# Patient Record
Sex: FEMALE | Race: WHITE | NOT HISPANIC OR LATINO | Employment: OTHER | URBAN - METROPOLITAN AREA
[De-identification: names, ages, dates, MRNs, and addresses within clinical notes are randomized per-mention and may not be internally consistent; named-entity substitution may affect disease eponyms.]

---

## 2017-01-01 ENCOUNTER — TELEPHONE (OUTPATIENT)
Dept: INTERNAL MEDICINE | Facility: CLINIC | Age: 72
End: 2017-01-01

## 2017-01-01 DIAGNOSIS — E11.69 TYPE 2 DIABETES MELLITUS WITH HYPERLIPIDEMIA: ICD-10-CM

## 2017-01-01 DIAGNOSIS — E78.5 TYPE 2 DIABETES MELLITUS WITH HYPERLIPIDEMIA: ICD-10-CM

## 2017-01-02 RX ORDER — GLIMEPIRIDE 4 MG/1
TABLET ORAL
Qty: 90 TABLET | Refills: 0 | Status: SHIPPED | OUTPATIENT
Start: 2017-01-02 | End: 2017-04-09 | Stop reason: SDUPTHER

## 2017-01-12 DIAGNOSIS — E78.5 TYPE 2 DIABETES MELLITUS WITH HYPERLIPIDEMIA: ICD-10-CM

## 2017-01-12 DIAGNOSIS — E11.69 TYPE 2 DIABETES MELLITUS WITH HYPERLIPIDEMIA: ICD-10-CM

## 2017-01-12 RX ORDER — SITAGLIPTIN AND METFORMIN HYDROCHLORIDE 1000; 50 MG/1; MG/1
TABLET, FILM COATED ORAL
Qty: 180 TABLET | Refills: 0 | Status: SHIPPED | OUTPATIENT
Start: 2017-01-12 | End: 2017-04-20 | Stop reason: SDUPTHER

## 2017-01-14 ENCOUNTER — PATIENT MESSAGE (OUTPATIENT)
Dept: INTERNAL MEDICINE | Facility: CLINIC | Age: 72
End: 2017-01-14

## 2017-01-26 ENCOUNTER — PATIENT MESSAGE (OUTPATIENT)
Dept: INTERNAL MEDICINE | Facility: CLINIC | Age: 72
End: 2017-01-26

## 2017-01-27 ENCOUNTER — TELEPHONE (OUTPATIENT)
Dept: FAMILY MEDICINE | Facility: CLINIC | Age: 72
End: 2017-01-27

## 2017-01-27 DIAGNOSIS — G89.29 CHRONIC RIGHT SHOULDER PAIN: Primary | ICD-10-CM

## 2017-01-27 DIAGNOSIS — M25.511 CHRONIC RIGHT SHOULDER PAIN: Primary | ICD-10-CM

## 2017-01-27 NOTE — TELEPHONE ENCOUNTER
Felixt message pasted           I would like to talk to Dr. Hahn about my shoulder. The physical therapist said I may have a torn rotator cuff.  I think (hope) it is an impinged muscle.  Also I need to talk to her about my surgery.  Thanks. Leny Eden

## 2017-02-02 ENCOUNTER — OFFICE VISIT (OUTPATIENT)
Dept: PRIMARY CARE CLINIC | Facility: CLINIC | Age: 72
End: 2017-02-02
Payer: MEDICARE

## 2017-02-02 VITALS
DIASTOLIC BLOOD PRESSURE: 72 MMHG | OXYGEN SATURATION: 98 % | BODY MASS INDEX: 28 KG/M2 | TEMPERATURE: 98 F | HEIGHT: 64 IN | SYSTOLIC BLOOD PRESSURE: 138 MMHG | WEIGHT: 164 LBS | HEART RATE: 73 BPM

## 2017-02-02 DIAGNOSIS — M25.511 ACUTE PAIN OF RIGHT SHOULDER: Primary | ICD-10-CM

## 2017-02-02 DIAGNOSIS — H02.401 DROOPING EYELID, RIGHT: ICD-10-CM

## 2017-02-02 PROCEDURE — 1125F AMNT PAIN NOTED PAIN PRSNT: CPT | Mod: S$GLB,,, | Performed by: NURSE PRACTITIONER

## 2017-02-02 PROCEDURE — 3078F DIAST BP <80 MM HG: CPT | Mod: S$GLB,,, | Performed by: NURSE PRACTITIONER

## 2017-02-02 PROCEDURE — 1160F RVW MEDS BY RX/DR IN RCRD: CPT | Mod: S$GLB,,, | Performed by: NURSE PRACTITIONER

## 2017-02-02 PROCEDURE — 99213 OFFICE O/P EST LOW 20 MIN: CPT | Mod: S$GLB,,, | Performed by: NURSE PRACTITIONER

## 2017-02-02 PROCEDURE — 1157F ADVNC CARE PLAN IN RCRD: CPT | Mod: S$GLB,,, | Performed by: NURSE PRACTITIONER

## 2017-02-02 PROCEDURE — 99999 PR PBB SHADOW E&M-EST. PATIENT-LVL IV: CPT | Mod: PBBFAC,,, | Performed by: NURSE PRACTITIONER

## 2017-02-02 PROCEDURE — 3075F SYST BP GE 130 - 139MM HG: CPT | Mod: S$GLB,,, | Performed by: NURSE PRACTITIONER

## 2017-02-02 PROCEDURE — 1159F MED LIST DOCD IN RCRD: CPT | Mod: S$GLB,,, | Performed by: NURSE PRACTITIONER

## 2017-02-02 NOTE — PATIENT INSTRUCTIONS
We'll set you up with an orthopedist referral to evaluate the shoulder.    See the eye doctor.  If you need further evaluation for the droopy eye, call.      I will be in the office through today.  I will return 2/13/17.  After that time, call your doctor's nurse, [facility] 24 hr nurse advice line or the on-call doctor for the day.    After hours and on weekends you can talk to a nurse 24 hrs a day/7 days a week.  Call [facility] at .      Thank you for using the [facility]!  DEANNE Matute, CNP, FNP-BC  [facility]  [facility]

## 2017-02-02 NOTE — PROGRESS NOTES
Lulu Eden is a 71 y.o. female patient of Dr. Lulu Hahn MD who presents to the clinic today for   Chief Complaint   Patient presents with    nerve damage     cannot lift right arm after surgery on November 9,2016    Shoulder Pain   .    HPI      Pt, who is not known to me, reports a new problem to me, as follows:  Right shoulder reduced ROM after the thyroid surgery.  She had an extensive surgery to remove cancerous tumor and thyroid removed.  When she's sitting, she has pain and very reduced ROM with pain.  Sometimes at night the muscle hurts across the upper right back.  Has lost ability to  plates from the lower level of the  but not from the upper level.  When she lies down she has full and painless ROM of the affected shoulder.    Also notes right eye drooping .    These symptoms began 11/29/16 and status is not improved..     Pt denies the following symptoms:  Numbness, tingling    Aggravating factors include sitting and moving the right shoulder .    Relieving factors include lying down .    OTC Medications tried are ibuprofen occ.    Prescription medications taken for symptoms are none.    Pertinent medical history:  Right arm dramatically changed after surgery. Prior to it she had no shoulder problems.  The next day, she had the problem.    ROS    Constitutional:  No fever, no fatigue.    HEENT:  Negative    Heart/Lung:  No new sxs    GI/:  No new sxs.    MS:  See Chief Complaint/HPI    Skin:  No rashes, itching..    ALLERGIES AND MEDICATIONS: updated and reviewed.  Review of patient's allergies indicates:   Allergen Reactions    Codeine Other (See Comments)     Other reaction(s): Stomach upset  Other reaction(s): Stomach upset    Iodine Other (See Comments)     Other reaction(s): ear pain  Other reaction(s): ear pain    Pseudoephedrine Other (See Comments)    Pseudoephedrine hcl Other (See Comments)     Other reaction(s): Hallucinations     Current Outpatient  "Prescriptions   Medication Sig Dispense Refill    aspirin 81 MG Chew Take 81 mg by mouth once daily.      atenolol (TENORMIN) 25 MG tablet TAKE ONE TABLET BY MOUTH EVERY MORNING 90 tablet 2    atenolol (TENORMIN) 50 MG tablet Take 1 tablet by mouth once daily. Take 25 mg in morning and 50 mg at night  2    blood sugar diagnostic (RELION PRIME TEST STRIPS) Strp Patient to check blood sugar twice daily. 100 strip 11    blood-glucose meter Misc 1 meter per pt and insurance choice.  Pt to check glucoses 2-4 times a day 1 each prn    diclofenac sodium 1 % Gel Apply 2 g topically 4 (four) times daily. 100 g 2    fexofenadine-pseudoephedrine (ALLEGRA-D)  mg per tablet PRN      FINACEA 15 % cream Apply topically once daily. Apply thin film to face qhs after moisturizing 50 g 3    glimepiride (AMARYL) 4 MG tablet TAKE ONE TABLET BY MOUTH EVERY MORNING BEFORE BREAKFAST 90 tablet 0    JANUMET 50-1,000 mg per tablet TAKE ONE TABLET BY MOUTH TWICE DAILY WITH MEALS 180 tablet 0    LANCETS MISC Patient to  check blood sugar FOUR times daily      levothyroxine (SYNTHROID) 112 MCG tablet Take 112 mcg by mouth once daily.      multivitamin-minerals-lutein (SENIOR VITAMIN) Tab Take 1 tablet by mouth once daily.      pantoprazole (PROTONIX) 40 MG tablet Take 1 tablet (40 mg total) by mouth once daily. (Patient taking differently: Take 40 mg by mouth daily as needed. ) 30 tablet 11     No current facility-administered medications for this visit.          PHYSICAL EXAM    Alert, coop 71 y.o. female patient in no acute distress.    Vitals:    02/02/17 1403   BP: 138/72   BP Location: Right arm   Patient Position: Sitting   BP Method: Manual   Pulse: 73   Temp: 98.1 °F (36.7 °C)   TempSrc: Oral   SpO2: 98%   Weight: 74.4 kg (164 lb 0.4 oz)   Height: 5' 4" (1.626 m)     VS reviewed.  VS stable.  CC, nursing note, medications, PMH, PSH and Soc hx all reviewed today.    Head:  Normocephalic, atraumatic.    EENT: Ext " nose/ears normal.  Eyes with normal lids, not injected.           Resp:  Respirations even, unlabored     Heart:  Normal rate.  CV:  Cap RF brisk, radial pulses 3+ bilat.      MS:  The shoulder of the right UE is/are noted to have no edema, no erythema, no ecchymosis.  The affected area is tender to palp over post shoulder/upper back.  Pain elicited with sup/int rotation of the shoulder, ROM of which is greatly reduced.  Pain is not noted in the shoulder joint(s).  ROM of the affected area is greatly limited--pt cannot reach her hand up to comb her hair..  ROM of the UEs is not symmetrical.  Strength with abduction/adducion of the UEs is 5/5 and is symmetrical.   are strong and symmetrical.  The other joints and areas of the UE are without erythema, edema, ecchymosis or pain.            NEURO:  Alert and oriented x 4.  Responds appropriately during interaction.                  Sensation to light touch intact over UEs bilat.                  At rest, right eye upper lid is partially closed.  With grimace, face moves symmetrically.  Sensation to light touch intact over the face.    Skin:  Warm, dry, color good.    Psych:  Responds appropriately throughout the visit.               Relaxed.  Well-groomed.    Acute pain of right shoulder  Comments:  significantly reduced ROM, ortho    Drooping eyelid, right  Comments:  check with eye doctor and call if he/she refers you back      Pt today presents with continuing significantly reduced ROM of the right shoulder since extensive thyroid surgery to remove tumors and her thyroid r/t cancer.  Right after surgery, she noted the problem with her right shoulder and it continues, despite PT.  She believes there's some sort of muscle entrapment.  She also describes that, when she lies flat, she can abduct the shoulder normally and w/o pain.  She has an eye doctor appt in 2 weeks to assess the drooping right upper lid.     This is a new problem to me.  No work up is planned.         Pt advised to perform comfort measures recommended on patient instruction sheet .    Ortho appt made today.  She will call after her eye doctor appointment in 2 weeks, if the eye doctor recommends neurological evaluation.  Explained exam findings, diagnosis and treatment plan to patient.  Questions answered and patient states understanding.

## 2017-02-02 NOTE — MR AVS SNAPSHOT
[facility]  [address] [facility] [address]  [facility] [address]  Phone: [phone]                  Lulu Eden   2017 2:00 PM   Office Visit    Description:  Female : 1945   Provider:  Whitney Abdi NP   Department:  [facility]           Reason for Visit     nerve damage     Shoulder Pain           Diagnoses this Visit        Comments    Acute pain of right shoulder    -  Primary significantly reduced ROM, ortho    Drooping eyelid, right     check with eye doctor and call if he/she refers you back           To Do List           Future Appointments        Provider Department Dept Phone    3/6/2017 10:00 AM Lulu Hahn MD [place] - Internal Medicine [phone]    3/15/2017 10:00 AM Tami Hernandez MD [place] - Dermatology [phone]    2017 11:00 AM Chino Koehler MD [place] - Endo/Diabetes [phone]      Goals (5 Years of Data)     None      [facility]     [facility] Nurse Care Line - 24/7 Assistance  Registered nurses in the [facility] provide clinical advisement, health education, appointment booking, and other advisory services.  Call for this free service at [phone].             Medications           Message regarding Medications     Verify the changes and/or additions to your medication regime listed below are the same as discussed with your clinician today.  If any of these changes or additions are incorrect, please notify your healthcare provider.             Verify that the below list of medications is an accurate representation of the medications you are currently taking.  If none reported, the list may be blank. If incorrect, please contact your healthcare provider. Carry this list with you in case of emergency.           Current Medications     aspirin 81 MG Chew Take 81 mg by mouth once daily.    atenolol (TENORMIN) 25 MG tablet TAKE ONE TABLET BY MOUTH EVERY MORNING    atenolol (TENORMIN) 50 MG tablet  "Take 1 tablet by mouth once daily. Take 25 mg in morning and 50 mg at night    blood sugar diagnostic (RELION PRIME TEST STRIPS) Strp Patient to check blood sugar twice daily.    blood-glucose meter Misc 1 meter per pt and insurance choice.  Pt to check glucoses 2-4 times a day    diclofenac sodium 1 % Gel Apply 2 g topically 4 (four) times daily.    fexofenadine-pseudoephedrine (ALLEGRA-D)  mg per tablet PRN    FINACEA 15 % cream Apply topically once daily. Apply thin film to face qhs after moisturizing    glimepiride (AMARYL) 4 MG tablet TAKE ONE TABLET BY MOUTH EVERY MORNING BEFORE BREAKFAST    JANUMET 50-1,000 mg per tablet TAKE ONE TABLET BY MOUTH TWICE DAILY WITH MEALS    LANCETS [facility] Patient to  check blood sugar FOUR times daily    levothyroxine (SYNTHROID) 112 MCG tablet Take 112 mcg by mouth once daily.    multivitamin-minerals-lutein (SENIOR VITAMIN) Tab Take 1 tablet by mouth once daily.    pantoprazole (PROTONIX) 40 MG tablet Take 1 tablet (40 mg total) by mouth once daily.           Clinical Reference Information           Your Vitals Were     BP Pulse Temp Height Weight Last Period    138/72 (BP Location: Right arm, Patient Position: Sitting, BP Method: Manual) 73 98.1 °F (36.7 °C) (Oral) 5' 4" (1.626 m) 74.4 kg (164 lb 0.4 oz) (Exact Date)    SpO2 BMI             98% 28.15 kg/m2         Blood Pressure          Most Recent Value    BP  138/72      Allergies as of 2/2/2017     Codeine    Iodine    Pseudoephedrine    Pseudoephedrine Hcl      Immunizations Administered on Date of Encounter - 2/2/2017     None      Instructions    We'll set you up with an orthopedist referral to evaluate the shoulder.    See the eye doctor.  If you need further evaluation for the droopy eye, call.      I will be in the office through today.  I will return 2/13/17.  After that time, call your doctor's nurse, [facility] 24 hr nurse advice line or the on-call doctor for the day.    After hours and on weekends you " can talk to a nurse 24 hrs a day/7 days a week.  Call [facility] at .      Thank you for using the [facility]!  Lesli Jin, DEANNE, CNP, FNP-BC  [facility]  [facility]         Language Assistance Services     ATTENTION: Language assistance services are available, free of charge. Please call [phone].      ATENCIÓN: Si habla español, tiene a pedro disposición servicios gratuitos de asistencia lingüística. Llame al [phone].     CHÚ Ý: N?u b?n nói Ti?ng Vi?t, có các d?ch v? h? tr? ngôn ng? mi?n phí dành cho b?n. G?i s? [phone].         [facility] complies with applicable Federal civil rights laws and does not discriminate on the basis of race, color, national origin, age, disability, or sex.

## 2017-02-02 NOTE — Clinical Note
Lulu Hahn MD,  I saw your patient today in the [facility].  If you have any questions, please do not hesitate to contact me.  Thank you!  KATHY Matute

## 2017-02-03 ENCOUNTER — PATIENT MESSAGE (OUTPATIENT)
Dept: FAMILY MEDICINE | Facility: CLINIC | Age: 72
End: 2017-02-03

## 2017-02-03 DIAGNOSIS — Z00.00 HEALTH CARE MAINTENANCE: Primary | ICD-10-CM

## 2017-02-03 DIAGNOSIS — Z12.31 ENCOUNTER FOR SCREENING MAMMOGRAM FOR MALIGNANT NEOPLASM OF BREAST: ICD-10-CM

## 2017-02-03 RX ORDER — ATENOLOL 50 MG/1
TABLET ORAL
Qty: 90 TABLET | Refills: 0 | Status: SHIPPED | OUTPATIENT
Start: 2017-02-03 | End: 2017-03-06 | Stop reason: SDUPTHER

## 2017-02-06 ENCOUNTER — PATIENT MESSAGE (OUTPATIENT)
Dept: FAMILY MEDICINE | Facility: CLINIC | Age: 72
End: 2017-02-06

## 2017-02-06 NOTE — TELEPHONE ENCOUNTER
----- Message from Mary Castro sent at 2/6/2017  9:55 AM CST -----  Contact: self  Patient received a recall letter for her mammogram  No order in chart   Please call  to advise.    Thanks

## 2017-02-08 ENCOUNTER — OFFICE VISIT (OUTPATIENT)
Dept: ORTHOPEDICS | Facility: CLINIC | Age: 72
End: 2017-02-08
Payer: MEDICARE

## 2017-02-08 VITALS — WEIGHT: 164 LBS | HEIGHT: 64 IN | BODY MASS INDEX: 28 KG/M2

## 2017-02-08 DIAGNOSIS — M25.511 RIGHT SHOULDER PAIN, UNSPECIFIED CHRONICITY: Primary | ICD-10-CM

## 2017-02-08 DIAGNOSIS — E78.5 TYPE 2 DIABETES MELLITUS WITH HYPERLIPIDEMIA: ICD-10-CM

## 2017-02-08 DIAGNOSIS — E11.69 TYPE 2 DIABETES MELLITUS WITH HYPERLIPIDEMIA: ICD-10-CM

## 2017-02-08 PROCEDURE — 99999 PR PBB SHADOW E&M-EST. PATIENT-LVL II: CPT | Mod: PBBFAC,,, | Performed by: ORTHOPAEDIC SURGERY

## 2017-02-08 PROCEDURE — 3060F POS MICROALBUMINURIA REV: CPT | Mod: S$GLB,,, | Performed by: ORTHOPAEDIC SURGERY

## 2017-02-08 PROCEDURE — 1159F MED LIST DOCD IN RCRD: CPT | Mod: S$GLB,,, | Performed by: ORTHOPAEDIC SURGERY

## 2017-02-08 PROCEDURE — 99204 OFFICE O/P NEW MOD 45 MIN: CPT | Mod: 25,S$GLB,, | Performed by: ORTHOPAEDIC SURGERY

## 2017-02-08 PROCEDURE — 20610 DRAIN/INJ JOINT/BURSA W/O US: CPT | Mod: RT,S$GLB,, | Performed by: ORTHOPAEDIC SURGERY

## 2017-02-08 PROCEDURE — 1160F RVW MEDS BY RX/DR IN RCRD: CPT | Mod: S$GLB,,, | Performed by: ORTHOPAEDIC SURGERY

## 2017-02-08 PROCEDURE — 3045F PR MOST RECENT HEMOGLOBIN A1C LEVEL 7.0-9.0%: CPT | Mod: S$GLB,,, | Performed by: ORTHOPAEDIC SURGERY

## 2017-02-08 PROCEDURE — 1157F ADVNC CARE PLAN IN RCRD: CPT | Mod: S$GLB,,, | Performed by: ORTHOPAEDIC SURGERY

## 2017-02-08 PROCEDURE — 1125F AMNT PAIN NOTED PAIN PRSNT: CPT | Mod: S$GLB,,, | Performed by: ORTHOPAEDIC SURGERY

## 2017-02-08 PROCEDURE — 2022F DILAT RTA XM EVC RTNOPTHY: CPT | Mod: S$GLB,,, | Performed by: ORTHOPAEDIC SURGERY

## 2017-02-08 RX ORDER — LEVOTHYROXINE SODIUM 137 UG/1
125 TABLET ORAL
COMMUNITY
Start: 2017-02-06 | End: 2019-06-14

## 2017-02-08 RX ORDER — TRIAMCINOLONE ACETONIDE 40 MG/ML
60 INJECTION, SUSPENSION INTRA-ARTICULAR; INTRAMUSCULAR
Status: DISCONTINUED | OUTPATIENT
Start: 2017-02-08 | End: 2017-02-08 | Stop reason: HOSPADM

## 2017-02-08 RX ADMIN — TRIAMCINOLONE ACETONIDE 60 MG: 40 INJECTION, SUSPENSION INTRA-ARTICULAR; INTRAMUSCULAR at 11:02

## 2017-02-08 NOTE — PROCEDURES
Large Joint Aspiration/Injection  Date/Time: 2/8/2017 11:09 AM  Performed by: YESENIA MARINELLI  Authorized by: YESENIA MARINELLI     Consent Done?:  Yes (Verbal)  Indications:  Pain  Procedure site marked: Yes    Timeout: Prior to procedure the correct patient, procedure, and site was verified      Location:  Shoulder  Site:  R subacromial bursa  Prep: Patient was prepped and draped in usual sterile fashion    Ultrasonic Guidance for needle placement: No  Needle size:  22 G  Approach:  Posterior  Medications:  60 mg triamcinolone acetonide 40 mg/mL  Patient tolerance:  Patient tolerated the procedure well with no immediate complications

## 2017-02-08 NOTE — PROGRESS NOTES
Lulu Eden, 71 years old, complaining of pain in her right shoulder.  She   has had that dating back to November.  She noticed after she returned from a   2-day hospital stay after thyroid surgery having difficulty putting her arm up   overhead when she is standing.  When she is lying down, she can put her arm up   overhead.  She is left hand dominant.  It is affecting her right shoulder, has   been to therapy for a few weeks without much relief.    Exam today shows that her passive motion is full.  She has difficulty putting   her arm up overhead.  Her cuff strength appears to be intact on testing to   resisted abduction and external rotation.  Skin is intact.  Compartments are   soft.    X-rays show arthritic type changes.    ASSESSMENT:  Shoulder arthrosis, possible cuff tear as well.    PLAN:  Kenalog injection, home exercise program.  Follow up in several weeks'   time to see how things are coming along.      PBB/PN  dd: 02/08/2017 11:02:12 (CST)  td: 02/08/2017 11:27:02 (CST)  Doc ID   #[id number]  Job ID #[id number]    CC:     Further History  Aching pain  Worse with activity  Relieved with rest  No other associated symptoms  No other radiation    Further Exam  Alert and oriented  Pleasant  Contralateral limb has appropriate range of motion for age and condition  Contralateral limb has appropriate strength for age and condition  Contralateral limb has appropriate stability  for age and condition  No adenopathy  Pulses are appropriate for current condition  Skin is intact        Chief Complaint    Chief Complaint   Patient presents with    Shoulder Pain     right x3mths, unknown injury       HPI  Lulu Eden is a 71 y.o.  female who presents with       Past Medical History  Past Medical History   Diagnosis Date    ALLERGIC RHINITIS     Benign hypertension     Diabetes mellitus     Diabetes mellitus type II     Hyperlipidemia     Obstructive sleep apnea     Skin cancer of nose 2002     right nasal  dr.erik rony.    Type 2 diabetes mellitus, uncontrolled 7/11/2012       Past Surgical History  Past Surgical History   Procedure Laterality Date    Tonsillectomy, adenoidectomy, bilateral myringotomy and tubes  age 2    Total vaginal hysterectomy  1977    Knee arthroplasty  2010     bilateral    Hysterectomy      Total knee arthroplasty       bilateral    Colonoscopy N/A 2/11/2016     Procedure: COLONOSCOPY;  Surgeon: Lloyd Bernabe MD;  Location: [facility];  Service: Endoscopy;  Laterality: N/A;       Medications  Current Outpatient Prescriptions   Medication Sig    aspirin 81 MG Chew Take 81 mg by mouth once daily.    atenolol (TENORMIN) 25 MG tablet TAKE ONE TABLET BY MOUTH EVERY MORNING    atenolol (TENORMIN) 50 MG tablet Take 1 tablet by mouth once daily. Take 25 mg in morning and 50 mg at night    atenolol (TENORMIN) 50 MG tablet TAKE 1 TABLET BY MOUTH EVERY DAY AS DIRECTED    blood sugar diagnostic (RELION PRIME TEST STRIPS) Strp Patient to check blood sugar twice daily.    blood-glucose meter Misc 1 meter per pt and insurance choice.  Pt to check glucoses 2-4 times a day    diclofenac sodium 1 % Gel Apply 2 g topically 4 (four) times daily.    fexofenadine-pseudoephedrine (ALLEGRA-D)  mg per tablet PRN    FINACEA 15 % cream Apply topically once daily. Apply thin film to face qhs after moisturizing    glimepiride (AMARYL) 4 MG tablet TAKE ONE TABLET BY MOUTH EVERY MORNING BEFORE BREAKFAST    JANUMET 50-1,000 mg per tablet TAKE ONE TABLET BY MOUTH TWICE DAILY WITH MEALS    LANCETS MISC Patient to  check blood sugar FOUR times daily    levothyroxine (SYNTHROID) 112 MCG tablet Take 112 mcg by mouth once daily.    multivitamin-minerals-lutein (SENIOR VITAMIN) Tab Take 1 tablet by mouth once daily.    pantoprazole (PROTONIX) 40 MG tablet Take 1 tablet (40 mg total) by mouth once daily. (Patient taking differently: Take 40 mg by mouth daily as needed. )    SYNTHROID 137 mcg Tab  tablet     THYROGEN 1.1 mg SolR      No current facility-administered medications for this visit.        Allergies  Review of patient's allergies indicates:   Allergen Reactions    Codeine Other (See Comments)     Other reaction(s): Stomach upset  Other reaction(s): Stomach upset    Iodine Other (See Comments)     Other reaction(s): ear pain  Other reaction(s): ear pain    Pseudoephedrine Other (See Comments)    Pseudoephedrine hcl Other (See Comments)     Other reaction(s): Hallucinations       Family History  Family History   Problem Relation Age of Onset    Stroke Father     Heart attack Mother     Cancer Sister      thyroid    Cancer Sister      leukemia    Skin cancer Sister     Melanoma Sister        Social History  Social History     Social History    Marital status:      Spouse name: N/A    Number of children: 4    Years of education: N/A     Occupational History    Not on file.     Social History Main Topics    Smoking status: Never Smoker    Smokeless tobacco: Not on file    Alcohol use Yes      Comment: rare    Drug use: No    Sexual activity: Not on file     Other Topics Concern    Not on file     Social History Narrative               Review of Systems     Constitutional: Negative    HENT: Negative  Eyes: Negative  Respiratory: Negative  Cardiovascular: Negative  Musculoskeletal: HPI  Skin: Negative  Neurological: Negative  Hematological: Negative  Endocrine: Negative                 Physical Exam    There were no vitals filed for this visit.  Body mass index is 28.15 kg/(m^2).  Physical Examination:     General appearance -  well appearing, and in no distress  Mental status - awake  Neck - supple  Chest -  symmetric air entry  Heart - normal rate   Abdomen - soft      Assessment     1. Right shoulder pain, unspecified chronicity          Plan

## 2017-02-08 NOTE — LETTER
February 8, 2017      Lulu Hahn MD  [address] [facility] [address] [place] [address]           [facility]  [address] [facility] [address] [place] [address]  Phone: [phone]          Patient: Lulu Eden   MR Number: [id number]   YOB: 1945   Date of Visit: 2/8/2017       Dear Dr. Lulu Hahn:    Thank you for referring Lulu Eden to me for evaluation. Attached you will find relevant portions of my assessment and plan of care.    If you have questions, please do not hesitate to call me. I look forward to following Lulu Eden along with you.    Sincerely,    Donald Michael MD    Enclosure  CC:  No Recipients    If you would like to receive this communication electronically, please contact [email or web address] or [phone] to request more information on [facility] [facility] Link access.    For providers and/or their staff who would like to refer a patient to [facility], please contact us through our one-stop-shop provider referral line, [facility], at [phone].    If you feel you have received this communication in error or would no longer like to receive these types of communications, please e-mail [email or web address]

## 2017-02-10 ENCOUNTER — HOSPITAL ENCOUNTER (OUTPATIENT)
Dept: RADIOLOGY | Facility: CLINIC | Age: 72
Discharge: HOME | End: 2017-02-10
Attending: INTERNAL MEDICINE
Payer: MEDICARE

## 2017-02-10 DIAGNOSIS — Z00.00 HEALTH CARE MAINTENANCE: ICD-10-CM

## 2017-02-10 DIAGNOSIS — Z12.31 ENCOUNTER FOR SCREENING MAMMOGRAM FOR MALIGNANT NEOPLASM OF BREAST: ICD-10-CM

## 2017-02-10 PROCEDURE — 77063 BREAST TOMOSYNTHESIS BI: CPT | Mod: 26,,, | Performed by: RADIOLOGY

## 2017-02-10 PROCEDURE — 77067 SCR MAMMO BI INCL CAD: CPT | Mod: 26,,, | Performed by: RADIOLOGY

## 2017-02-10 PROCEDURE — 77067 SCR MAMMO BI INCL CAD: CPT | Mod: TC

## 2017-03-06 ENCOUNTER — OFFICE VISIT (OUTPATIENT)
Dept: INTERNAL MEDICINE | Facility: CLINIC | Age: 72
End: 2017-03-06
Payer: MEDICARE

## 2017-03-06 VITALS
WEIGHT: 161.63 LBS | SYSTOLIC BLOOD PRESSURE: 110 MMHG | BODY MASS INDEX: 27.59 KG/M2 | HEIGHT: 64 IN | DIASTOLIC BLOOD PRESSURE: 84 MMHG | OXYGEN SATURATION: 99 % | RESPIRATION RATE: 16 BRPM | HEART RATE: 62 BPM

## 2017-03-06 DIAGNOSIS — E11.69 UNCONTROLLED TYPE 2 DIABETES MELLITUS WITH OTHER SPECIFIED COMPLICATION, UNSPECIFIED LONG TERM INSULIN USE STATUS: ICD-10-CM

## 2017-03-06 DIAGNOSIS — R79.89 ELEVATED PTHRP LEVEL: ICD-10-CM

## 2017-03-06 DIAGNOSIS — E11.65 UNCONTROLLED TYPE 2 DIABETES MELLITUS WITH OTHER SPECIFIED COMPLICATION, UNSPECIFIED LONG TERM INSULIN USE STATUS: ICD-10-CM

## 2017-03-06 DIAGNOSIS — E11.59 HYPERTENSION ASSOCIATED WITH DIABETES: Primary | ICD-10-CM

## 2017-03-06 DIAGNOSIS — I15.2 HYPERTENSION ASSOCIATED WITH DIABETES: Primary | ICD-10-CM

## 2017-03-06 DIAGNOSIS — C73 CANCER OF THYROID: ICD-10-CM

## 2017-03-06 DIAGNOSIS — E11.69 HYPERLIPIDEMIA DUE TO TYPE 2 DIABETES MELLITUS: ICD-10-CM

## 2017-03-06 DIAGNOSIS — E78.5 HYPERLIPIDEMIA DUE TO TYPE 2 DIABETES MELLITUS: ICD-10-CM

## 2017-03-06 PROCEDURE — 99999 PR PBB SHADOW E&M-EST. PATIENT-LVL III: CPT | Mod: PBBFAC,,, | Performed by: INTERNAL MEDICINE

## 2017-03-06 PROCEDURE — 99499 UNLISTED E&M SERVICE: CPT | Mod: S$GLB,,, | Performed by: INTERNAL MEDICINE

## 2017-03-06 PROCEDURE — 1126F AMNT PAIN NOTED NONE PRSNT: CPT | Mod: S$GLB,,, | Performed by: INTERNAL MEDICINE

## 2017-03-06 PROCEDURE — 99214 OFFICE O/P EST MOD 30 MIN: CPT | Mod: S$GLB,,, | Performed by: INTERNAL MEDICINE

## 2017-03-06 PROCEDURE — 3074F SYST BP LT 130 MM HG: CPT | Mod: S$GLB,,, | Performed by: INTERNAL MEDICINE

## 2017-03-06 PROCEDURE — 2022F DILAT RTA XM EVC RTNOPTHY: CPT | Mod: S$GLB,,, | Performed by: INTERNAL MEDICINE

## 2017-03-06 PROCEDURE — 3060F POS MICROALBUMINURIA REV: CPT | Mod: S$GLB,,, | Performed by: INTERNAL MEDICINE

## 2017-03-06 PROCEDURE — 1159F MED LIST DOCD IN RCRD: CPT | Mod: S$GLB,,, | Performed by: INTERNAL MEDICINE

## 2017-03-06 PROCEDURE — 3079F DIAST BP 80-89 MM HG: CPT | Mod: S$GLB,,, | Performed by: INTERNAL MEDICINE

## 2017-03-06 PROCEDURE — 1157F ADVNC CARE PLAN IN RCRD: CPT | Mod: S$GLB,,, | Performed by: INTERNAL MEDICINE

## 2017-03-06 PROCEDURE — 3045F PR MOST RECENT HEMOGLOBIN A1C LEVEL 7.0-9.0%: CPT | Mod: S$GLB,,, | Performed by: INTERNAL MEDICINE

## 2017-03-06 PROCEDURE — 1160F RVW MEDS BY RX/DR IN RCRD: CPT | Mod: S$GLB,,, | Performed by: INTERNAL MEDICINE

## 2017-03-06 RX ORDER — NAPROXEN SODIUM 220 MG/1
81 TABLET, FILM COATED ORAL DAILY
COMMUNITY
Start: 2017-03-06

## 2017-03-06 NOTE — MR AVS SNAPSHOT
[facility]  [address]  [facility] [place] [address]  Phone: [phone]  Fax: [phone]                  Lulu Eden   3/6/2017 10:00 AM   Office Visit    Description:  Female : 1945   Provider:  Lulu Hahn MD   Department:  [facility]           Reason for Visit     Medication Refill           Diagnoses this Visit        Comments    Hypertension associated with diabetes    -  Primary     Hyperlipidemia due to type 2 diabetes mellitus                To Do List           Future Appointments        Provider Department Dept Phone    3/15/2017 10:00 AM Tami Hernandez MD [place] - Dermatology [phone]    3/20/2017 8:15 AM LAB, [facility] - Lab [phone]    2017 10:40 AM Lulu Hahn MD [facility] [phone]      Goals (5 Years of Data)     None      Follow-Up and Disposition     Return in about 6 months (around 2017).      PURCHASE these Medications (No prescription required)        Start End    aspirin 81 MG Chew 3/6/2017     Sig - Route: Take 1 tablet (81 mg total) by mouth once daily. - Oral    Class: OTC      [facility] On [facility]     [facility] On [facility] Nurse Care Line -  Assistance  Registered nurses in the [facility] Call Center provide clinical advisement, health education, appointment booking, and other advisory services.  Call for this free service at [phone].             Medications           Message regarding Medications     Verify the changes and/or additions to your medication regime listed below are the same as discussed with your clinician today.  If any of these changes or additions are incorrect, please notify your healthcare provider.        CHANGE how you are taking these medications     Start Taking Instead of    aspirin 81 MG Chew aspirin 81 MG Chew    Dosage:  Take 1 tablet (81 mg total) by mouth once daily. Dosage:  Take 81 mg by mouth once daily.    Reason for  "Change:  Reorder       STOP taking these medications     THYROGEN 1.1 mg SolR     pantoprazole (PROTONIX) 40 MG tablet Take 1 tablet (40 mg total) by mouth once daily.           Verify that the below list of medications is an accurate representation of the medications you are currently taking.  If none reported, the list may be blank. If incorrect, please contact your healthcare provider. Carry this list with you in case of emergency.           Current Medications     aspirin 81 MG Chew Take 1 tablet (81 mg total) by mouth once daily.    atenolol (TENORMIN) 25 MG tablet TAKE ONE TABLET BY MOUTH EVERY MORNING    atenolol (TENORMIN) 50 MG tablet Take 1 tablet by mouth once daily. Take 25 mg in morning and 50 mg at night    blood sugar diagnostic (RELION PRIME TEST STRIPS) Strp Patient to check blood sugar twice daily.    blood-glucose meter Misc 1 meter per pt and insurance choice.  Pt to check glucoses 2-4 times a day    diclofenac sodium 1 % Gel Apply 2 g topically 4 (four) times daily.    fexofenadine-pseudoephedrine (ALLEGRA-D)  mg per tablet PRN    FINACEA 15 % cream Apply topically once daily. Apply thin film to face qhs after moisturizing    glimepiride (AMARYL) 4 MG tablet TAKE ONE TABLET BY MOUTH EVERY MORNING BEFORE BREAKFAST    JANUMET 50-1,000 mg per tablet TAKE ONE TABLET BY MOUTH TWICE DAILY WITH MEALS    LANCETS [facility] Patient to  check blood sugar FOUR times daily    multivitamin-minerals-lutein (SENIOR VITAMIN) Tab Take 1 tablet by mouth once daily.    SYNTHROID 137 mcg Tab tablet            Clinical Reference Information           Your Vitals Were     BP Pulse Resp Height Weight Last Period    110/84 62 16 5' 4" (1.626 m) 73.3 kg (161 lb 9.6 oz) (Exact Date)    SpO2 BMI             99% 27.74 kg/m2         Blood Pressure          Most Recent Value    BP  110/84      Allergies as of 3/6/2017     Codeine    Iodine    Pseudoephedrine    Pseudoephedrine Hcl      Immunizations Administered on Date of " Encounter - 3/6/2017     None      Orders Placed During Today's Visit     Future Labs/Procedures Expected by Expires    Comprehensive metabolic panel  3/20/2017 6/4/2017    Hemoglobin A1c  3/20/2017 6/4/2017    Lipid panel  3/20/2017 5/5/2018      Language Assistance Services     ATTENTION: Language assistance services are available, free of charge. Please call [phone].      ATENCIÓN: Si habla español, tiene a pedro disposición servicios gratuitos de asistencia lingüística. Llame al [phone].     CHÚ Ý: N?u b?n nói Ti?ng Vi?t, có các d?ch v? h? tr? ngôn ng? mi?n phí dành cho b?n. G?i s? [phone].         [facility] complies with applicable Federal civil rights laws and does not discriminate on the basis of race, color, national origin, age, disability, or sex.

## 2017-03-06 NOTE — PROGRESS NOTES
HISTORY OF PRESENT ILLNESS:  Pt. is a 71 y.o. female presents for monitoring of her DM type 2, HTN, hyperlipidemia.  She is due for her Lipid panel, eye exam.  She had been on lipitor in the past, but stopped both that and her pravastatin.  She sees Dr. Caraballo for her eye exam, has upcoming appt. 5/11/17.  Is getting her thyroid ablated tomorrow.  She has seen Dr. Morales of ENT surgery at [facility], has history of thyroid cancer and elevated PTH/parathyroid adenoma.  NO symptoms of hypoglycemia.  In the AMs: 140, prelucn: 99, predinner: 124, much improved.  Has been on a no iodine diet.  Is keeping to better diet.  She will get pneumovax at next appt.      ROS:  GENERAL: No fever, chills, fatigability or weight loss.  SKIN: No rashes, itching or changes in color or texture of skin.  HEAD: No headaches or recent head trauma.  EARS: Denies ear pain, discharge or vertigo.  NOSE: No loss of smell, no epistaxis or postnasal drip.  MOUTH & THROAT: No hoarseness or change in voice. No excessive gum bleeding.  NODES: Denies swollen glands.  CHEST: Denies PETIT, cyanosis, wheezing, cough and sputum production.  CARDIOVASCULAR: Denies chest pain, PND, orthopnea or reduced exercise tolerance.  ABDOMEN: Appetite fine. No weight loss. Denies constipation, diarrhea, abdominal pain, hematemesis or blood in stool.  URINARY: No flank pain, dysuria or hematuria.  PERIPHERAL VASCULAR: No claudication or cyanosis. No edema.  MUSCULOSKELETAL: No joint stiffness or swelling. Denies back pain.  NEUROLOGIC: Denies numbness    PE:   Vitals:   Vitals:    03/06/17 1001   BP: 110/84   Pulse: 62   Resp: 16     GENERAL: no acute distress, A&Ox3, comfortable.  Female with BMI of 27   HEENT: tympanic membranes clear, nasal mucosa pink, no pharyngeal erythema or exudate  NECK: supple, no cervical lymphadenopathy, no thyromegaly; no supraclavicular nodes;   CHEST:  clear to auscultation bilaterally, no crackles or wheeze; no increased work of  breathing;  CARDIOVASCULAR: regular rate and rhythm, no rubs, murmurs or gallops.  ABDOMEN: normal bowel sounds, soft non-tender, non-distended; no palpable organomegaly;   EXT: no clubbing, cyanosis or edema.     ASSESSMENT/PLAN:    Dm Type 2/uncontrolled: will recheck hgb A1C; was 7.2 at last reading, though elevated, at acceptable goal for her age;    Hypertension associated with diabetes  -     Hemoglobin A1c; Future; Expected date: 3/20/17  -     Comprehensive metabolic panel; Future; Expected date: 3/20/17  -     Lipid panel; Future; Expected date: 3/20/17    Hyperlipidemia due to type 2 diabetes mellitus  -     Hemoglobin A1c; Future; Expected date: 3/20/17  -     Comprehensive metabolic panel; Future; Expected date: 3/20/17  -     Lipid panel; Future; Expected date: 3/20/17    Other orders  -     aspirin 81 MG Chew; Take 1 tablet (81 mg total) by mouth once daily.    Thyroid cancer/elevated PTH: has been seeing endocrinology at [facility];    Call if condition changes or worsens.    Answers for HPI/ROS submitted by the patient on 3/4/2017   Diabetes problem  Diabetes type: type 2  MedicAlert ID: No  Disease duration: 5 years  blurred vision: No  chest pain: No  fatigue: Yes  foot paresthesias: No  foot ulcerations: No  polydipsia: No  polyphagia: No  polyuria: No  visual change: No  weakness: No  weight loss: Yes  Symptom course: stable  confusion: No  dizziness: No  headaches: Yes  hunger: No  mood changes: No  nervous/anxious: No  pallor: No  seizures: No  sleepiness: Yes  speech difficulty: No  sweats: No  tremors: No  blackouts: No  hospitalization: No  nocturnal hypoglycemia: No  required assistance: No  required glucagon: No  CVA: No  heart disease: No  impotence: No  nephropathy: No  peripheral neuropathy: No  PVD: No  retinopathy: No  autonomic neuropathy: No  CAD risks: family history  Current treatments: diet, oral agent (dual therapy)  Treatment compliance: all of the time  Home blood tests: 3-4 x per  day  Monitoring compliance: excellent  Blood glucose trend: decreasing steadily  Weight trend: stable  Current diet: generally healthy  Meal planning: carbohydrate counting  Exercise: rarely  Dietitian visit: No  Eye exam current: Yes  Sees podiatrist: No

## 2017-03-07 ENCOUNTER — TELEPHONE (OUTPATIENT)
Dept: ADMINISTRATIVE | Facility: HOSPITAL | Age: 72
End: 2017-03-07

## 2017-03-07 NOTE — LETTER
March 7, 2017    Dr Manny Caraballo             [facility]  [address]  [place] [address]  Phone: [phone] March 7, 2017     Patient: Lulu Eden    YOB: 1945   Date of Visit: 3/7/2017       To Whom It May Concern:                                                                                    [facility]    We are seeing Lulu Eden in the clinic today at [facility].  Lulu Hahn MD is their PCP.  She/He has an outstanding lab/procedure at this time when reviewing their chart.  To help with our Health Maintenance records will you please supply the following:        [x]  Eye Exam                                     Please Fax to [facility] at [phone]    Thank you for your help, Val Russo LPN-[facility].  If I can be of any assistance you can call at [phone]        [facility]         If you have any questions or concerns, please don't hesitate to call.    Sincerely,        Lulu Hahn MD

## 2017-03-20 ENCOUNTER — LAB VISIT (OUTPATIENT)
Dept: LAB | Facility: HOSPITAL | Age: 72
End: 2017-03-20
Attending: INTERNAL MEDICINE
Payer: MEDICARE

## 2017-03-20 DIAGNOSIS — E11.59 HYPERTENSION ASSOCIATED WITH DIABETES: ICD-10-CM

## 2017-03-20 DIAGNOSIS — I15.2 HYPERTENSION ASSOCIATED WITH DIABETES: ICD-10-CM

## 2017-03-20 DIAGNOSIS — E11.69 HYPERLIPIDEMIA DUE TO TYPE 2 DIABETES MELLITUS: ICD-10-CM

## 2017-03-20 DIAGNOSIS — E78.5 HYPERLIPIDEMIA DUE TO TYPE 2 DIABETES MELLITUS: ICD-10-CM

## 2017-03-20 LAB
ALBUMIN SERPL BCP-MCNC: 4 G/DL
ALP SERPL-CCNC: 55 U/L
ALT SERPL W/O P-5'-P-CCNC: 21 U/L
ANION GAP SERPL CALC-SCNC: 9 MMOL/L
AST SERPL-CCNC: 16 U/L
BILIRUB SERPL-MCNC: 0.7 MG/DL
BUN SERPL-MCNC: 15 MG/DL
CALCIUM SERPL-MCNC: 9.2 MG/DL
CHLORIDE SERPL-SCNC: 102 MMOL/L
CHOLEST/HDLC SERPL: 3.7 {RATIO}
CO2 SERPL-SCNC: 28 MMOL/L
CREAT SERPL-MCNC: 0.7 MG/DL
EST. GFR  (AFRICAN AMERICAN): >60 ML/MIN/1.73 M^2
EST. GFR  (NON AFRICAN AMERICAN): >60 ML/MIN/1.73 M^2
GLUCOSE SERPL-MCNC: 171 MG/DL
HDL/CHOLESTEROL RATIO: 27.2 %
HDLC SERPL-MCNC: 228 MG/DL
HDLC SERPL-MCNC: 62 MG/DL
LDLC SERPL CALC-MCNC: 123.2 MG/DL
NONHDLC SERPL-MCNC: 166 MG/DL
POTASSIUM SERPL-SCNC: 4.3 MMOL/L
PROT SERPL-MCNC: 6.7 G/DL
SODIUM SERPL-SCNC: 139 MMOL/L
TRIGL SERPL-MCNC: 214 MG/DL

## 2017-03-20 PROCEDURE — 36415 COLL VENOUS BLD VENIPUNCTURE: CPT | Mod: PO

## 2017-03-20 PROCEDURE — 80061 LIPID PANEL: CPT

## 2017-03-20 PROCEDURE — 80053 COMPREHEN METABOLIC PANEL: CPT

## 2017-03-20 PROCEDURE — 83036 HEMOGLOBIN GLYCOSYLATED A1C: CPT

## 2017-03-21 LAB
ESTIMATED AVG GLUCOSE: 174 MG/DL
HBA1C MFR BLD HPLC: 7.7 %

## 2017-03-24 ENCOUNTER — TELEPHONE (OUTPATIENT)
Dept: INTERNAL MEDICINE | Facility: CLINIC | Age: 72
End: 2017-03-24

## 2017-03-24 DIAGNOSIS — E78.5 HYPERLIPIDEMIA, UNSPECIFIED HYPERLIPIDEMIA TYPE: Primary | ICD-10-CM

## 2017-03-24 DIAGNOSIS — E11.65 UNCONTROLLED TYPE 2 DIABETES MELLITUS WITH OTHER SPECIFIED COMPLICATION, UNSPECIFIED LONG TERM INSULIN USE STATUS: ICD-10-CM

## 2017-03-24 DIAGNOSIS — E11.69 UNCONTROLLED TYPE 2 DIABETES MELLITUS WITH OTHER SPECIFIED COMPLICATION, UNSPECIFIED LONG TERM INSULIN USE STATUS: ICD-10-CM

## 2017-03-24 RX ORDER — ATORVASTATIN CALCIUM 20 MG/1
20 TABLET, FILM COATED ORAL DAILY
Qty: 30 TABLET | Refills: 1 | Status: SHIPPED | OUTPATIENT
Start: 2017-03-24 | End: 2017-03-24 | Stop reason: SDUPTHER

## 2017-03-25 RX ORDER — ATORVASTATIN CALCIUM 20 MG/1
TABLET, FILM COATED ORAL
Qty: 30 TABLET | Refills: 1 | Status: SHIPPED | OUTPATIENT
Start: 2017-03-25 | End: 2017-07-20 | Stop reason: SDUPTHER

## 2017-04-09 DIAGNOSIS — E11.69 TYPE 2 DIABETES MELLITUS WITH HYPERLIPIDEMIA: ICD-10-CM

## 2017-04-09 DIAGNOSIS — E78.5 TYPE 2 DIABETES MELLITUS WITH HYPERLIPIDEMIA: ICD-10-CM

## 2017-04-10 RX ORDER — GLIMEPIRIDE 4 MG/1
TABLET ORAL
Qty: 90 TABLET | Refills: 1 | Status: SHIPPED | OUTPATIENT
Start: 2017-04-10 | End: 2017-10-22 | Stop reason: SDUPTHER

## 2017-04-24 ENCOUNTER — LAB VISIT (OUTPATIENT)
Dept: LAB | Facility: HOSPITAL | Age: 72
End: 2017-04-24
Attending: INTERNAL MEDICINE
Payer: MEDICARE

## 2017-04-24 DIAGNOSIS — E78.5 HYPERLIPIDEMIA, UNSPECIFIED HYPERLIPIDEMIA TYPE: ICD-10-CM

## 2017-04-24 LAB
ALT SERPL W/O P-5'-P-CCNC: 33 U/L
AST SERPL-CCNC: 24 U/L
CHOLEST/HDLC SERPL: 3 {RATIO}
HDL/CHOLESTEROL RATIO: 33.6 %
HDLC SERPL-MCNC: 146 MG/DL
HDLC SERPL-MCNC: 49 MG/DL
LDLC SERPL CALC-MCNC: 75.8 MG/DL
NONHDLC SERPL-MCNC: 97 MG/DL
TRIGL SERPL-MCNC: 106 MG/DL

## 2017-04-24 PROCEDURE — 84460 ALANINE AMINO (ALT) (SGPT): CPT

## 2017-04-24 PROCEDURE — 84450 TRANSFERASE (AST) (SGOT): CPT

## 2017-04-24 PROCEDURE — 80061 LIPID PANEL: CPT

## 2017-04-24 PROCEDURE — 36415 COLL VENOUS BLD VENIPUNCTURE: CPT | Mod: PO

## 2017-05-03 ENCOUNTER — OFFICE VISIT (OUTPATIENT)
Dept: DERMATOLOGY | Facility: CLINIC | Age: 72
End: 2017-05-03
Payer: MEDICARE

## 2017-05-03 DIAGNOSIS — L82.1 SK (SEBORRHEIC KERATOSIS): ICD-10-CM

## 2017-05-03 DIAGNOSIS — L90.5 SCAR: Primary | ICD-10-CM

## 2017-05-03 DIAGNOSIS — D22.9 NEVUS: ICD-10-CM

## 2017-05-03 DIAGNOSIS — L81.4 LENTIGO: ICD-10-CM

## 2017-05-03 DIAGNOSIS — L82.0 INFLAMED SEBORRHEIC KERATOSIS: ICD-10-CM

## 2017-05-03 DIAGNOSIS — Z85.828 PERSONAL HISTORY OF SKIN CANCER: ICD-10-CM

## 2017-05-03 PROCEDURE — 99213 OFFICE O/P EST LOW 20 MIN: CPT | Mod: S$GLB,,, | Performed by: DERMATOLOGY

## 2017-05-03 PROCEDURE — 99999 PR PBB SHADOW E&M-EST. PATIENT-LVL II: CPT | Mod: PBBFAC,,, | Performed by: DERMATOLOGY

## 2017-05-03 PROCEDURE — 1160F RVW MEDS BY RX/DR IN RCRD: CPT | Mod: S$GLB,,, | Performed by: DERMATOLOGY

## 2017-05-03 PROCEDURE — 1159F MED LIST DOCD IN RCRD: CPT | Mod: S$GLB,,, | Performed by: DERMATOLOGY

## 2017-05-03 NOTE — PROGRESS NOTES
Subjective:       Patient ID:  Lulu Eden is a 72 y.o. female who presents for   Chief Complaint   Patient presents with    Lesion     HPI Comments: Pt here today for a UBSE. Pt c/o scaly lesions on chest and left arm x many months. Left arm lesion is itchy . No bleeding or pain. No prev tx.  This is a high risk patient here to check for the development of new lesions.      Lesion         Review of Systems   Skin: Positive for daily sunscreen use and activity-related sunscreen use.   Hematologic/Lymphatic: Does not bruise/bleed easily.        Objective:    Physical Exam   Constitutional: She appears well-developed and well-nourished. No distress.   Neurological: She is alert and oriented to person, place, and time. She is not disoriented.   Psychiatric: She has a normal mood and affect.   Skin:   Areas Examined (abnormalities noted in diagram):   Scalp / Hair Palpated and Inspected  Head / Face Inspection Performed  Neck Inspection Performed  Chest / Axilla Inspection Performed  Abdomen Inspection Performed  Back Inspection Performed  RUE Inspected  LUE Inspection Performed  Nails and Digits Inspection Performed                       Diagram Legend     Erythematous scaling macule/papule c/w actinic keratosis       Vascular papule c/w angioma      Pigmented verrucoid papule/plaque c/w seborrheic keratosis      Yellow umbilicated papule c/w sebaceous hyperplasia      Irregularly shaped tan macule c/w lentigo     1-2 mm smooth white papules consistent with Milia      Movable subcutaneous cyst with punctum c/w epidermal inclusion cyst      Subcutaneous movable cyst c/w pilar cyst      Firm pink to brown papule c/w dermatofibroma      Pedunculated fleshy papule(s) c/w skin tag(s)      Evenly pigmented macule c/w junctional nevus     Mildly variegated pigmented, slightly irregular-bordered macule c/w mildly atypical nevus      Flesh colored to evenly pigmented papule c/w intradermal nevus       Pink pearly  papule/plaque c/w basal cell carcinoma      Erythematous hyperkeratotic cursted plaque c/w SCC      Surgical scar with no sign of skin cancer recurrence      Open and closed comedones      Inflammatory papules and pustules      Verrucoid papule consistent consistent with wart     Erythematous eczematous patches and plaques     Dystrophic onycholytic nail with subungual debris c/w onychomycosis     Umbilicated papule    Erythematous-base heme-crusted tan verrucoid plaque consistent with inflamed seborrheic keratosis     Erythematous Silvery Scaling Plaque c/w Psoriasis     See annotation      Assessment / Plan:        Scar  Personal history of skin cancer  Area(s) of previous NMSC evaluated with no signs of recurrence.    Upper body skin examination performed today including at least 6 points as noted in physical examination. No lesions suspicious for malignancy noted.    Lentigo  This is a benign hyperpigmented sun induced lesion. Daily sun protection will reduce the number of new lesions. Treatment of these benign lesions are considered cosmetic.  The nature of sun-induced photo-aging and skin cancers is discussed.  Sun avoidance, protective clothing, and the use of 30-SPF sunscreens is advised. Observe closely for skin damage/changes, and call if such occurs.    Inflamed seborrheic keratosis  Reassurance given to patient. No treatment is necessary.   Treatment of benign, asymptomatic lesions may be considered cosmetic.    SK (seborrheic keratosis)  These are benign inherited growths without a malignant potential. Reassurance given to patient. No treatment is necessary.     Nevus  Discussed ABCDE's of nevi.  Monitor for new mole or moles that are becoming bigger, darker, irritated, or developing irregular borders. Brochure provided.             Return in about 1 year (around 5/3/2018).

## 2017-05-29 RX ORDER — ATENOLOL 50 MG/1
TABLET ORAL
Qty: 90 TABLET | Refills: 0 | Status: SHIPPED | OUTPATIENT
Start: 2017-05-29 | End: 2017-09-12

## 2017-07-16 DIAGNOSIS — E78.5 TYPE 2 DIABETES MELLITUS WITH HYPERLIPIDEMIA: ICD-10-CM

## 2017-07-16 DIAGNOSIS — E11.69 TYPE 2 DIABETES MELLITUS WITH HYPERLIPIDEMIA: ICD-10-CM

## 2017-07-18 RX ORDER — SITAGLIPTIN AND METFORMIN HYDROCHLORIDE 1000; 50 MG/1; MG/1
TABLET, FILM COATED ORAL
Qty: 180 TABLET | Refills: 3 | Status: SHIPPED | OUTPATIENT
Start: 2017-07-18 | End: 2018-07-29 | Stop reason: SDUPTHER

## 2017-07-20 DIAGNOSIS — E11.65 UNCONTROLLED TYPE 2 DIABETES MELLITUS WITH OTHER SPECIFIED COMPLICATION, UNSPECIFIED LONG TERM INSULIN USE STATUS: ICD-10-CM

## 2017-07-20 DIAGNOSIS — E11.69 UNCONTROLLED TYPE 2 DIABETES MELLITUS WITH OTHER SPECIFIED COMPLICATION, UNSPECIFIED LONG TERM INSULIN USE STATUS: ICD-10-CM

## 2017-07-21 DIAGNOSIS — E11.65 UNCONTROLLED TYPE 2 DIABETES MELLITUS WITH OTHER SPECIFIED COMPLICATION, UNSPECIFIED LONG TERM INSULIN USE STATUS: ICD-10-CM

## 2017-07-21 DIAGNOSIS — E11.69 UNCONTROLLED TYPE 2 DIABETES MELLITUS WITH OTHER SPECIFIED COMPLICATION, UNSPECIFIED LONG TERM INSULIN USE STATUS: ICD-10-CM

## 2017-07-21 RX ORDER — ATORVASTATIN CALCIUM 20 MG/1
TABLET, FILM COATED ORAL
Qty: 30 TABLET | Refills: 1 | Status: SHIPPED | OUTPATIENT
Start: 2017-07-21 | End: 2017-07-21 | Stop reason: SDUPTHER

## 2017-07-21 RX ORDER — ATORVASTATIN CALCIUM 20 MG/1
TABLET, FILM COATED ORAL
Qty: 90 TABLET | Refills: 1 | Status: SHIPPED | OUTPATIENT
Start: 2017-07-21 | End: 2018-05-16

## 2017-09-12 NOTE — TELEPHONE ENCOUNTER
Spoke to pt. Advised that Dr Hahn was out of [place]. She will call back one week prior to her appt  to have labs done

## 2017-09-12 NOTE — TELEPHONE ENCOUNTER
----- Message from Trip Nielsen sent at 9/12/2017  1:15 PM CDT -----  Contact: Patient  Patient has an appointment scheduled for November and needs orders for the labs put in to the system.  Can you please look in to this matter and call the patient back at [phone].  Thank you

## 2017-09-15 RX ORDER — ATENOLOL 50 MG/1
TABLET ORAL
Qty: 90 TABLET | Refills: 0 | Status: SHIPPED | OUTPATIENT
Start: 2017-09-15 | End: 2017-11-09

## 2017-09-18 DIAGNOSIS — I15.2 HYPERTENSION ASSOCIATED WITH DIABETES: ICD-10-CM

## 2017-09-18 DIAGNOSIS — I10 ESSENTIAL HYPERTENSION: ICD-10-CM

## 2017-09-18 DIAGNOSIS — E11.59 HYPERTENSION ASSOCIATED WITH DIABETES: ICD-10-CM

## 2017-09-19 RX ORDER — ATENOLOL 25 MG/1
TABLET ORAL
Qty: 90 TABLET | Refills: 0 | Status: SHIPPED | OUTPATIENT
Start: 2017-09-19 | End: 2017-11-16 | Stop reason: ALTCHOICE

## 2017-09-21 RX ORDER — ATENOLOL 50 MG/1
50 TABLET ORAL DAILY
Refills: 2 | Status: CANCELLED | OUTPATIENT
Start: 2017-09-21

## 2017-09-29 DIAGNOSIS — E11.9 TYPE 2 DIABETES MELLITUS WITHOUT COMPLICATION: ICD-10-CM

## 2017-10-22 ENCOUNTER — TELEPHONE (OUTPATIENT)
Dept: INTERNAL MEDICINE | Facility: CLINIC | Age: 72
End: 2017-10-22

## 2017-10-22 DIAGNOSIS — E11.69 TYPE 2 DIABETES MELLITUS WITH HYPERLIPIDEMIA: ICD-10-CM

## 2017-10-22 DIAGNOSIS — E78.5 TYPE 2 DIABETES MELLITUS WITH HYPERLIPIDEMIA: ICD-10-CM

## 2017-10-26 RX ORDER — GLIMEPIRIDE 4 MG/1
TABLET ORAL
Qty: 30 TABLET | Refills: 0 | Status: SHIPPED | OUTPATIENT
Start: 2017-10-26 | End: 2017-11-19 | Stop reason: SDUPTHER

## 2017-11-09 ENCOUNTER — OFFICE VISIT (OUTPATIENT)
Dept: OBSTETRICS AND GYNECOLOGY | Facility: CLINIC | Age: 72
End: 2017-11-09
Payer: MEDICARE

## 2017-11-09 VITALS
BODY MASS INDEX: 27.74 KG/M2 | WEIGHT: 162.5 LBS | SYSTOLIC BLOOD PRESSURE: 137 MMHG | HEIGHT: 64 IN | DIASTOLIC BLOOD PRESSURE: 69 MMHG | HEART RATE: 82 BPM

## 2017-11-09 DIAGNOSIS — N64.4 BREAST PAIN, RIGHT: Primary | ICD-10-CM

## 2017-11-09 PROCEDURE — 99202 OFFICE O/P NEW SF 15 MIN: CPT | Mod: S$GLB,,, | Performed by: NURSE PRACTITIONER

## 2017-11-09 PROCEDURE — 99999 PR PBB SHADOW E&M-EST. PATIENT-LVL IV: CPT | Mod: PBBFAC,,, | Performed by: NURSE PRACTITIONER

## 2017-11-09 NOTE — PROGRESS NOTES
Chief Complaint   Patient presents with    breat pain     right breast, worsening //no lumps noted by pt       History of Present Illness: Lulu Eden is a 72 y.o. female that presents today 11/9/2017 for   Pt here c/o right breast pain. She reports that about 2 weeks ago she laid on the floor to get something from underneath her bed and when she did this she felt an immense about pain. She states that she feels as if the pain has worsened since the initial event. She reports that the breast feels better if she is wearing a bra, but the pain is still noticeable. She feels more pain if she is not wearing a bra and it hurts for her to take a deep breath. She denies any complaints about breathing except deep breathing. Pt denies any other complaints or concerns at this time.       Chief Complaint   Patient presents with    breat pain     right breast, worsening //no lumps noted by pt         Past Medical History:   Diagnosis Date    ALLERGIC RHINITIS     Benign hypertension     Diabetes mellitus     Diabetes mellitus type II     Hyperlipidemia     Obstructive sleep apnea     Skin cancer of nose 2002    right nasal ala, .    Type 2 diabetes mellitus, uncontrolled 7/11/2012       Past Surgical History:   Procedure Laterality Date    COLONOSCOPY N/A 2/11/2016    Procedure: COLONOSCOPY;  Surgeon: Lloyd Bernabe MD;  [facility];  Service: Endoscopy;  Laterality: N/A;    HYSTERECTOMY      KNEE ARTHROPLASTY  2010    bilateral    TONSILLECTOMY, ADENOIDECTOMY, BILATERAL MYRINGOTOMY AND TUBES  age 2    TOTAL KNEE ARTHROPLASTY      bilateral    TOTAL VAGINAL HYSTERECTOMY  1977       Current Outpatient Prescriptions   Medication Sig Dispense Refill    aspirin 81 MG Chew Take 1 tablet (81 mg total) by mouth once daily.      atenolol (TENORMIN) 50 MG tablet Take 1 tablet by mouth once daily. Take 25 mg in morning and 50 mg at night  2    atorvastatin (LIPITOR) 20 MG tablet TAKE 1 TABLET  BY MOUTH EVERY DAY 90 tablet 1    blood sugar diagnostic (RELION PRIME TEST STRIPS) Strp Patient to check blood sugar twice daily. 100 strip 11    blood-glucose meter Misc 1 meter per pt and insurance choice.  Pt to check glucoses 2-4 times a day 1 each prn    diclofenac sodium 1 % Gel Apply 2 g topically 4 (four) times daily. 100 g 2    fexofenadine-pseudoephedrine (ALLEGRA-D)  mg per tablet PRN      FINACEA 15 % cream Apply topically once daily. Apply thin film to face qhs after moisturizing 50 g 3    glimepiride (AMARYL) 4 MG tablet TAKE ONE TABLET BY MOUTH IN THE MORNING BEFORE BREAKFAST 30 tablet 0    JANUMET 50-1,000 mg per tablet TAKE ONE TABLET BY MOUTH TWICE DAILY WITH MEALS 180 tablet 3    LANCETS MISC Patient to  check blood sugar FOUR times daily      multivitamin-minerals-lutein (SENIOR VITAMIN) Tab Take 1 tablet by mouth once daily.      SYNTHROID 137 mcg Tab tablet       atenolol (TENORMIN) 25 MG tablet TAKE ONE TABLET BY MOUTH EVERY MORNING 90 tablet 0     No current facility-administered medications for this visit.        Review of patient's allergies indicates:   Allergen Reactions    Codeine Other (See Comments)     Other reaction(s): Stomach upset  Other reaction(s): Stomach upset    Iodine Other (See Comments)     Other reaction(s): ear pain  Other reaction(s): ear pain    Pseudoephedrine Other (See Comments)    Pseudoephedrine hcl Other (See Comments)     Other reaction(s): Hallucinations       Family History   Problem Relation Age of Onset    Stroke Father     Heart attack Mother     Melanoma Sister     Cancer Sister      thyroid    Cancer Sister      leukemia    Skin cancer Sister        Social History   Substance Use Topics    Smoking status: Never Smoker    Smokeless tobacco: Not on file    Alcohol use Yes      Comment: rare       OB History    Para Term  AB Living   4 4 0         SAB TAB Ectopic Multiple Live Births                  # Outcome  "Date GA Lbr Yimi/2nd Weight Sex Delivery Anes PTL Lv   4 Para      Vag-Spont      3 Para      Vag-Spont      2 Para      Vag-Spont      1 Para      Vag-Spont             Review of Symptoms:  GENERAL: Denies weight gain or weight loss. Feeling well overall.   SKIN: Denies rash or lesions.   ABDOMEN: No abdominal pain, constipation, diarrhea, nausea, vomiting or rectal bleeding.   URINARY: No frequency, dysuria, hematuria, or burning on urination.      /69   Pulse 82   Ht 5' 4" (1.626 m)   Wt 73.7 kg (162 lb 7.7 oz)   LMP  (Exact Date)   Physical Exam:  APPEARANCE: Well nourished, well developed, in no acute distress.  SKIN: Normal skin turgor, no lesions.  RESPIRATORY: Normal respiratory effort with no retractions or use of accessory muscles  BREASTS: Symmetrical, no visible lesions. No palpable masses, nipple discharge or adenopathy bilaterally. R breast tender upon palpation; red streaking underneath arm.     ASSESSMENT/PLAN:  Breast pain, right  -     Mammo Digital Diagnostic Bilateral With CAD; Future; Expected date: 11/09/2017  -     US Breast Right Complete; Future; Expected date: 11/09/2017       Follow-up:  Will follow-up once results are received  RTC TBA  RTC as needed      "

## 2017-11-13 ENCOUNTER — LAB VISIT (OUTPATIENT)
Dept: LAB | Facility: HOSPITAL | Age: 72
End: 2017-11-13
Attending: INTERNAL MEDICINE
Payer: MEDICARE

## 2017-11-13 DIAGNOSIS — E11.9 TYPE 2 DIABETES MELLITUS WITHOUT COMPLICATION: ICD-10-CM

## 2017-11-13 LAB
ESTIMATED AVG GLUCOSE: 166 MG/DL
HBA1C MFR BLD HPLC: 7.4 %

## 2017-11-13 PROCEDURE — 36415 COLL VENOUS BLD VENIPUNCTURE: CPT | Mod: PO

## 2017-11-13 PROCEDURE — 83036 HEMOGLOBIN GLYCOSYLATED A1C: CPT

## 2017-11-15 NOTE — PROGRESS NOTES
HISTORY OF PRESENT ILLNESS:  Pt. is a 72 y.o. female presents for monitoring of her DM type 2, HTN, hyperlipidemia, hypothyroidism.  Her LDL is at diabetic goal.  She is UTD with her urine microalbumin.  Her hgb a1C has improved from previous.  In the AMs: 160-190 in the AM.  She is due for TSH.  She will be getting with Dr. Zacarias of endocrine.  She is due for pneumovax and flu shot, declines both shots.  She would like refill of her voltaren gel.  She has had contusion of her right breast, she saw the NP in [place], when she takes a deep breath is painful, right arm movement is painful, she is having diagnostic US later in the month.  She is trying to lose weight, is walking in the afternoons.    Health Maintenance Topics with due status: Not Due       Topic Last Completion Date    DEXA SCAN 02/09/2015    Colonoscopy 02/11/2016    TETANUS VACCINE 09/06/2016    Mammogram 02/11/2017    Urine Microalbumin 03/06/2017    Lipid Panel 04/24/2017    Eye Exam 05/11/2017    Foot Exam 09/06/2017    Hemoglobin A1c 11/13/2017     Health Maintenance Due   Topic Date Due    Pneumococcal (65+) (2 of 2 - PPSV23) 03/01/2017       Lab Results   Component Value Date    WBC 7.77 10/21/2016    HGB 13.9 10/21/2016    HCT 40.8 10/21/2016     (H) 10/21/2016    CHOL 146 04/24/2017    TRIG 106 04/24/2017    HDL 49 04/24/2017    LDLCALC 75.8 04/24/2017    ALT 33 04/24/2017    AST 24 04/24/2017     03/20/2017    K 4.3 03/20/2017     03/20/2017    CREATININE 0.7 03/20/2017    BUN 15 03/20/2017    CO2 28 03/20/2017    ALBUMIN 4.0 03/20/2017    TSH 0.569 10/21/2016    INR 1.1 12/18/2010    HGBA1C 7.4 (H) 11/13/2017       Past Medical History:   Diagnosis Date    ALLERGIC RHINITIS     Benign hypertension     Diabetes mellitus     Diabetes mellitus type II     Hyperlipidemia     Obstructive sleep apnea     Skin cancer of nose 2002    right nasal ala, .    Type 2 diabetes mellitus, uncontrolled 7/11/2012        Past Surgical History:   Procedure Laterality Date    COLONOSCOPY N/A 2/11/2016    Procedure: COLONOSCOPY;  Surgeon: Lloyd Bernabe MD;  Location[facility];  Service: Endoscopy;  Laterality: N/A;    HYSTERECTOMY      KNEE ARTHROPLASTY  2010    bilateral    TONSILLECTOMY, ADENOIDECTOMY, BILATERAL MYRINGOTOMY AND TUBES  age 2    TOTAL KNEE ARTHROPLASTY      bilateral    TOTAL VAGINAL HYSTERECTOMY  1977       Social History     Social History    Marital status:      Spouse name: N/A    Number of children: 4    Years of education: N/A     Social History Main Topics    Smoking status: Never Smoker    Smokeless tobacco: None    Alcohol use Yes      Comment: rare    Drug use: No    Sexual activity: Not Asked     Other Topics Concern    None     Social History Narrative    None       ROS:  GENERAL: No fever, chills, fatigability or weight loss.  SKIN: No rashes, itching or changes in color or texture of skin.  HEAD: No headaches or recent head trauma.  EARS: Denies ear pain, discharge or vertigo.  NOSE: No loss of smell, no epistaxis or postnasal drip.  MOUTH & THROAT: No hoarseness or change in voice. No excessive gum bleeding.  NODES: Denies swollen glands.  CHEST: Denies PETIT, cyanosis, wheezing, cough and sputum production.  CARDIOVASCULAR: Positive chest wall pain, PND, orthopnea or reduced exercise tolerance.  ABDOMEN: Appetite fine. No weight loss. Denies constipation, diarrhea, abdominal pain, hematemesis or blood in stool.  URINARY: No flank pain, dysuria or hematuria.  PERIPHERAL VASCULAR: No claudication or cyanosis. No edema.  MUSCULOSKELETAL: Positive joint stiffness to hands; no swelling. Denies back pain.  NEUROLOGIC: Denies numbness    PE:   Vitals:   Vitals:    11/16/17 1519   BP: 110/72   Pulse: 78     GENERAL: no acute distress, A&Ox3, comfortable.  Female with BMI of 28  HEENT: tympanic membranes clear, nasal mucosa pink, no pharyngeal erythema or exudate  NECK: supple,  no cervical lymphadenopathy, no thyromegaly; no supraclavicular nodes;   CHEST:  clear to auscultation bilaterally, no crackles or wheeze; no increased work of breathing; no pain on palpation of right breast on ROM; was painful this AM;  CARDIOVASCULAR: regular rate and rhythm, no rubs, murmurs or gallops.  ABDOMEN: normal bowel sounds, soft non-tender, non-distended; no palpable organomegaly;   EXT: no clubbing, cyanosis or edema.     Protective Sensation (w/ 10 gram monofilament):  Right: Intact  Left: Intact    Visual Inspection:  Dry Skin -  Bilateral    Pedal Pulses:   Right: Present  Left: Present    Posterior tibialis:   Right:Present  Left: Present        ASSESSMENT/PLAN:    Pain in both hands  -     diclofenac sodium 1 % Gel; Apply 2 g topically 4 (four) times daily.  Dispense: 100 g; Refill: 2    Hypertension associated with diabetes  she has been out of her atenolol 25 mg which she has added to her 50 mg in the past, but pulse and B/P in good control, and we will hold at present; she is to monitor her home B/Ps and pulses;  -     Cancel: atenolol (TENORMIN) 25 MG tablet; Take 1 tablet (25 mg total) by mouth every morning.  Dispense: 90 tablet; Refill: 0      Pain in both hands  -     diclofenac sodium 1 % Gel; Apply 2 g topically 4 (four) times daily.  Dispense: 100 g; Refill: 2    Acute chest wall pain  -     X-Ray Ribs 3 Views Right; Future; Expected date: 11/16/2017    Hypothyroidism, unspecified type: will be seeing endocrinology;    -     Cancel: atenolol (TENORMIN) 25 MG tablet; Take 1 tablet (25 mg total) by mouth every morning.  Dispense: 90 tablet; Refill: 0              Medication List with Changes/Refills   Current Medications    ASPIRIN 81 MG CHEW    Take 1 tablet (81 mg total) by mouth once daily.         ATENOLOL (TENORMIN) 50 MG TABLET    Take 1 tablet by mouth once daily. Take 25 mg in morning and 50 mg at night    ATORVASTATIN (LIPITOR) 20 MG TABLET    TAKE 1 TABLET BY MOUTH EVERY DAY     BLOOD SUGAR DIAGNOSTIC (RELION PRIME TEST STRIPS) STRP    Patient to check blood sugar twice daily.    BLOOD-GLUCOSE METER MISC    1 meter per pt and insurance choice.  Pt to check glucoses 2-4 times a day    DICLOFENAC SODIUM 1 % GEL    Apply 2 g topically 4 (four) times daily.    FEXOFENADINE-PSEUDOEPHEDRINE (ALLEGRA-D)  MG PER TABLET    PRN    FINACEA 15 % CREAM    Apply topically once daily. Apply thin film to face qhs after moisturizing    GLIMEPIRIDE (AMARYL) 4 MG TABLET    TAKE ONE TABLET BY MOUTH IN THE MORNING BEFORE BREAKFAST    JANUMET 50-1,000 MG PER TABLET    TAKE ONE TABLET BY MOUTH TWICE DAILY WITH MEALS    LANCETS [facility]    Patient to  check blood sugar FOUR times daily    MULTIVITAMIN-MINERALS-LUTEIN (SENIOR VITAMIN) TAB    Take 1 tablet by mouth once daily.    SYNTHROID 137 MCG TAB TABLET         Call if condition changes or worsens.

## 2017-11-16 ENCOUNTER — OFFICE VISIT (OUTPATIENT)
Dept: INTERNAL MEDICINE | Facility: CLINIC | Age: 72
End: 2017-11-16
Payer: MEDICARE

## 2017-11-16 ENCOUNTER — HOSPITAL ENCOUNTER (OUTPATIENT)
Dept: RADIOLOGY | Facility: HOSPITAL | Age: 72
Discharge: HOME | End: 2017-11-16
Attending: INTERNAL MEDICINE
Payer: MEDICARE

## 2017-11-16 VITALS
HEART RATE: 78 BPM | BODY MASS INDEX: 27.85 KG/M2 | SYSTOLIC BLOOD PRESSURE: 110 MMHG | HEIGHT: 64 IN | OXYGEN SATURATION: 98 % | DIASTOLIC BLOOD PRESSURE: 72 MMHG | WEIGHT: 163.13 LBS

## 2017-11-16 DIAGNOSIS — R07.89 ACUTE CHEST WALL PAIN: ICD-10-CM

## 2017-11-16 DIAGNOSIS — M79.642 PAIN IN BOTH HANDS: Primary | ICD-10-CM

## 2017-11-16 DIAGNOSIS — E11.59 HYPERTENSION ASSOCIATED WITH DIABETES: ICD-10-CM

## 2017-11-16 DIAGNOSIS — I15.2 HYPERTENSION ASSOCIATED WITH DIABETES: ICD-10-CM

## 2017-11-16 DIAGNOSIS — M79.641 PAIN IN BOTH HANDS: Primary | ICD-10-CM

## 2017-11-16 DIAGNOSIS — E03.9 HYPOTHYROIDISM, UNSPECIFIED TYPE: ICD-10-CM

## 2017-11-16 PROCEDURE — 99999 PR PBB SHADOW E&M-EST. PATIENT-LVL III: CPT | Mod: PBBFAC,,, | Performed by: INTERNAL MEDICINE

## 2017-11-16 PROCEDURE — 99214 OFFICE O/P EST MOD 30 MIN: CPT | Mod: S$GLB,,, | Performed by: INTERNAL MEDICINE

## 2017-11-16 PROCEDURE — 99499 UNLISTED E&M SERVICE: CPT | Mod: S$GLB,,, | Performed by: INTERNAL MEDICINE

## 2017-11-16 PROCEDURE — 71101 X-RAY EXAM UNILAT RIBS/CHEST: CPT | Mod: 26,,, | Performed by: RADIOLOGY

## 2017-11-16 PROCEDURE — 71101 X-RAY EXAM UNILAT RIBS/CHEST: CPT | Mod: TC,PO

## 2017-11-16 RX ORDER — ATENOLOL 25 MG/1
25 TABLET ORAL EVERY MORNING
Qty: 90 TABLET | Refills: 0 | Status: CANCELLED | OUTPATIENT
Start: 2017-11-16

## 2017-11-16 RX ORDER — DICLOFENAC SODIUM 10 MG/G
2 GEL TOPICAL 4 TIMES DAILY
Qty: 100 G | Refills: 2 | Status: SHIPPED | OUTPATIENT
Start: 2017-11-16 | End: 2019-08-29

## 2017-11-19 DIAGNOSIS — E11.69 TYPE 2 DIABETES MELLITUS WITH HYPERLIPIDEMIA: ICD-10-CM

## 2017-11-19 DIAGNOSIS — E78.5 TYPE 2 DIABETES MELLITUS WITH HYPERLIPIDEMIA: ICD-10-CM

## 2017-11-20 RX ORDER — GLIMEPIRIDE 4 MG/1
TABLET ORAL
Qty: 90 TABLET | Refills: 1 | Status: SHIPPED | OUTPATIENT
Start: 2017-11-20 | End: 2018-06-19 | Stop reason: SDUPTHER

## 2017-11-21 ENCOUNTER — HOSPITAL ENCOUNTER (OUTPATIENT)
Dept: RADIOLOGY | Facility: HOSPITAL | Age: 72
Discharge: HOME | End: 2017-11-21
Attending: NURSE PRACTITIONER
Payer: MEDICARE

## 2017-11-21 ENCOUNTER — TELEPHONE (OUTPATIENT)
Dept: OBSTETRICS AND GYNECOLOGY | Facility: CLINIC | Age: 72
End: 2017-11-21

## 2017-11-21 VITALS — HEIGHT: 64 IN | BODY MASS INDEX: 27.83 KG/M2 | WEIGHT: 163 LBS

## 2017-11-21 DIAGNOSIS — N64.4 BREAST PAIN, RIGHT: ICD-10-CM

## 2017-11-21 PROCEDURE — 76642 ULTRASOUND BREAST LIMITED: CPT | Mod: 26,RT,, | Performed by: RADIOLOGY

## 2017-11-21 PROCEDURE — 77066 DX MAMMO INCL CAD BI: CPT | Mod: TC

## 2017-11-21 PROCEDURE — 77066 DX MAMMO INCL CAD BI: CPT | Mod: 26,,, | Performed by: RADIOLOGY

## 2017-11-21 PROCEDURE — 77062 BREAST TOMOSYNTHESIS BI: CPT | Mod: 26,,, | Performed by: RADIOLOGY

## 2017-11-21 PROCEDURE — 76642 ULTRASOUND BREAST LIMITED: CPT | Mod: TC,RT

## 2017-11-21 NOTE — TELEPHONE ENCOUNTER
Patient informed of results and recommendations as noted by MARY ALICE De Santiago NP. Patient verbalized understanding.

## 2017-11-21 NOTE — TELEPHONE ENCOUNTER
Please call and inform pt that her diagnostic mammogram and breast ultrasound di not show any abnormalities or malignancies.     It is recommended that she continue yearly mammograms.    Thanks!

## 2017-12-01 DIAGNOSIS — E11.9 DIABETES MELLITUS WITHOUT COMPLICATION: ICD-10-CM

## 2017-12-19 RX ORDER — ATENOLOL 50 MG/1
TABLET ORAL
Qty: 90 TABLET | Refills: 3 | Status: SHIPPED | OUTPATIENT
Start: 2017-12-19 | End: 2019-01-07 | Stop reason: SDUPTHER

## 2017-12-28 ENCOUNTER — OFFICE VISIT (OUTPATIENT)
Dept: DERMATOLOGY | Facility: CLINIC | Age: 72
End: 2017-12-28
Payer: MEDICARE

## 2017-12-28 DIAGNOSIS — D22.9 NEVUS: ICD-10-CM

## 2017-12-28 DIAGNOSIS — L82.1 SK (SEBORRHEIC KERATOSIS): ICD-10-CM

## 2017-12-28 DIAGNOSIS — L82.0 INFLAMED SEBORRHEIC KERATOSIS: ICD-10-CM

## 2017-12-28 DIAGNOSIS — D18.00 ANGIOMA: ICD-10-CM

## 2017-12-28 DIAGNOSIS — L71.9 ROSACEA: Primary | ICD-10-CM

## 2017-12-28 DIAGNOSIS — L90.5 SCAR: ICD-10-CM

## 2017-12-28 DIAGNOSIS — Z85.828 PERSONAL HISTORY OF SKIN CANCER: ICD-10-CM

## 2017-12-28 PROCEDURE — 99999 PR PBB SHADOW E&M-EST. PATIENT-LVL II: CPT | Mod: PBBFAC,,, | Performed by: DERMATOLOGY

## 2017-12-28 PROCEDURE — 99214 OFFICE O/P EST MOD 30 MIN: CPT | Mod: 25,S$GLB,, | Performed by: DERMATOLOGY

## 2017-12-28 PROCEDURE — 17110 DESTRUCTION B9 LES UP TO 14: CPT | Mod: S$GLB,,, | Performed by: DERMATOLOGY

## 2017-12-28 RX ORDER — AZELAIC ACID 0.15 G/G
GEL TOPICAL DAILY
Qty: 50 G | Refills: 3 | Status: SHIPPED | OUTPATIENT
Start: 2017-12-28 | End: 2019-08-29

## 2017-12-28 NOTE — PROGRESS NOTES
Subjective:       Patient ID:  Lulu Eden is a 72 y.o. female who presents for   Chief Complaint   Patient presents with    Skin Check     High risk pt with significant sun exposure and hx NMSC right nasal ala here to check for the development on new leisons on upper body.  C/o lesion left inner forearm x years.  Recurrently itchy.  Also has rosacea.  tx with finacea. Maybe helps.  Seems to get worse w age        Review of Systems   Constitutional: Negative for fever, chills, fatigue and malaise.   Skin: Positive for daily sunscreen use.   Hematologic/Lymphatic: Bruises/bleeds easily.        Objective:    Physical Exam   Constitutional: She appears well-developed and well-nourished. No distress.   Eyes:       Neurological: She is alert and oriented to person, place, and time. She is not disoriented.   Psychiatric: She has a normal mood and affect.   Skin:   Areas Examined (abnormalities noted in diagram):   Scalp / Hair Palpated and Inspected  Head / Face Inspection Performed  Neck Inspection Performed  Chest / Axilla Inspection Performed  Abdomen Inspection Performed  Back Inspection Performed  RUE Inspected  LUE Inspection Performed  Nails and Digits Inspection Performed                       Diagram Legend     Erythematous scaling macule/papule c/w actinic keratosis       Vascular papule c/w angioma      Pigmented verrucoid papule/plaque c/w seborrheic keratosis      Yellow umbilicated papule c/w sebaceous hyperplasia      Irregularly shaped tan macule c/w lentigo     1-2 mm smooth white papules consistent with Milia      Movable subcutaneous cyst with punctum c/w epidermal inclusion cyst      Subcutaneous movable cyst c/w pilar cyst      Firm pink to brown papule c/w dermatofibroma      Pedunculated fleshy papule(s) c/w skin tag(s)      Evenly pigmented macule c/w junctional nevus     Mildly variegated pigmented, slightly irregular-bordered macule c/w mildly atypical nevus      Flesh colored to evenly  pigmented papule c/w intradermal nevus       Pink pearly papule/plaque c/w basal cell carcinoma      Erythematous hyperkeratotic cursted plaque c/w SCC      Surgical scar with no sign of skin cancer recurrence      Open and closed comedones      Inflammatory papules and pustules      Verrucoid papule consistent consistent with wart     Erythematous eczematous patches and plaques     Dystrophic onycholytic nail with subungual debris c/w onychomycosis     Umbilicated papule    Erythematous-base heme-crusted tan verrucoid plaque consistent with inflamed seborrheic keratosis     Erythematous Silvery Scaling Plaque c/w Psoriasis     See annotation      Assessment / Plan:        Rosacea  -     FINACEA 15 % gel; Apply topically once daily. Apply thin film to face qhs then moisturize  Dispense: 50 g; Refill: 3  cerave am and pm   Consider oracea in the summer  Pt not interested in laser    Nevus  Discussed ABCDE's of nevi.  Monitor for new mole or moles that are becoming bigger, darker, irritated, or developing irregular borders. Brochure provided.    Angioma  These are benign vascular lesions that are inherited.  Treatment is not necessary.    SK (seborrheic keratosis)  These are benign inherited growths without a malignant potential. Reassurance given to patient. No treatment is necessary.     Personal history of skin cancer  Scar  Area(s) of previous NMSC evaluated with no signs of recurrence.    Upper body skin examination performed today including at least 6 points as noted in physical examination. No lesions suspicious for malignancy noted.    Inflamed seborrheic keratosis  Cryosurgery procedure note:    Verbal consent from the patient is obtained. Liquid nitrogen cryosurgery is applied to 1 lesions to produce a freeze injury. The patient is aware that blisters may form and is instructed on wound care with gentle cleansing and use of vaseline ointment to keep moist until healed. The patient is supplied a handout on  cryosurgery and is instructed to call if lesions do not completely resolve.             Return in about 1 year (around 12/28/2018).

## 2018-01-02 ENCOUNTER — DOCUMENTATION ONLY (OUTPATIENT)
Dept: DERMATOLOGY | Facility: CLINIC | Age: 73
End: 2018-01-02

## 2018-01-02 NOTE — PROGRESS NOTES
The PA for Finacea was denied by [employer]. The ins said the pt has not tried and failed Metgrogel/cream  0.75% with in the last 12 months.  I sent a copy of the denial letter to Dr. Hernandez.

## 2018-01-03 ENCOUNTER — TELEPHONE (OUTPATIENT)
Dept: DERMATOLOGY | Facility: CLINIC | Age: 73
End: 2018-01-03

## 2018-01-03 DIAGNOSIS — L71.9 ROSACEA: Primary | ICD-10-CM

## 2018-01-03 RX ORDER — METRONIDAZOLE 7.5 MG/G
CREAM TOPICAL
Qty: 45 G | Refills: 3 | Status: SHIPPED | OUTPATIENT
Start: 2018-01-03 | End: 2018-05-16 | Stop reason: SDUPTHER

## 2018-01-03 NOTE — TELEPHONE ENCOUNTER
----- Message from Tami Corbin MA sent at 1/2/2018 10:53 AM CST -----  Regarding: Finace was denied by Nito GARG pt - The PA for Finacea was denied by [employer]. The ins wants the pt to have tried Metrogel/cream./lotion 0.75%.  I faxed a copy of the denial letter to the [place] office.   Can you change the med?  Thanks.

## 2018-02-04 ENCOUNTER — NURSE TRIAGE (OUTPATIENT)
Dept: ADMINISTRATIVE | Facility: CLINIC | Age: 73
End: 2018-02-04

## 2018-02-04 NOTE — TELEPHONE ENCOUNTER
"    Reason for Disposition   SEVERE dizziness (vertigo) (e.g., unable to walk without assistance)    Answer Assessment - Initial Assessment Questions  1. DESCRIPTION: "Describe your dizziness."      Room is spinning  2. VERTIGO: "Do you feel like either you or the room is spinning or tilting?"       yes  3. LIGHTHEADED: "Do you feel lightheaded?" (e.g., somewhat faint, woozy, weak upon standing)      yes  4. SEVERITY: "How bad is it?"  "Can you walk?"    - MILD - Feels unsteady but walking normally.    - MODERATE - Feels very unsteady when walking, but not falling; interferes with normal activities (e.g., school, work) .    - SEVERE - Unable to walk without falling (requires assistance).      moderate  5. ONSET:  "When did the dizziness begin?"      today  6. AGGRAVATING FACTORS: "Does anything make it worse?" (e.g., standing, change in head position)      standing  7. CAUSE: "What do you think is causing the dizziness?"      unsure  8. RECURRENT SYMPTOM: "Have you had dizziness before?" If so, ask: "When was the last time?" "What happened that time?"      yes  9. OTHER SYMPTOMS: "Do you have any other symptoms?" (e.g., headache, weakness, numbness, vomiting, earache)      Headache, nausea  10. PREGNANCY: "Is there any chance you are pregnant?" "When was your last menstrual period?"        n/a    Protocols used: ST DIZZINESS - VERTIGO-A-      "

## 2018-02-04 NOTE — TELEPHONE ENCOUNTER
Patient's symptoms discussed with Dr. Medrano. Dr. Medrano agrees with disposition for patient to be evaluated in the ED for vertigo.

## 2018-02-05 NOTE — TELEPHONE ENCOUNTER
COREEN  Spoke to pt and she states had a severe episode of vertigo on yesterday. States she got Antivert OTC and is now feeling well.

## 2018-05-04 DIAGNOSIS — E11.9 TYPE 2 DIABETES MELLITUS WITHOUT COMPLICATION: ICD-10-CM

## 2018-05-11 DIAGNOSIS — E11.9 TYPE 2 DIABETES MELLITUS WITHOUT COMPLICATION: ICD-10-CM

## 2018-05-16 ENCOUNTER — OFFICE VISIT (OUTPATIENT)
Dept: FAMILY MEDICINE | Facility: CLINIC | Age: 73
End: 2018-05-16
Payer: MEDICARE

## 2018-05-16 VITALS
HEIGHT: 64 IN | WEIGHT: 160.69 LBS | SYSTOLIC BLOOD PRESSURE: 130 MMHG | DIASTOLIC BLOOD PRESSURE: 80 MMHG | BODY MASS INDEX: 27.43 KG/M2 | HEART RATE: 79 BPM | RESPIRATION RATE: 18 BRPM | TEMPERATURE: 98 F | OXYGEN SATURATION: 95 %

## 2018-05-16 DIAGNOSIS — E78.5 TYPE 2 DIABETES MELLITUS WITH HYPERLIPIDEMIA: Primary | ICD-10-CM

## 2018-05-16 DIAGNOSIS — E11.59 HYPERTENSION ASSOCIATED WITH DIABETES: ICD-10-CM

## 2018-05-16 DIAGNOSIS — I15.2 HYPERTENSION ASSOCIATED WITH DIABETES: ICD-10-CM

## 2018-05-16 DIAGNOSIS — E11.69 HYPERLIPIDEMIA DUE TO TYPE 2 DIABETES MELLITUS: ICD-10-CM

## 2018-05-16 DIAGNOSIS — E11.69 TYPE 2 DIABETES MELLITUS WITH HYPERLIPIDEMIA: Primary | ICD-10-CM

## 2018-05-16 DIAGNOSIS — E78.5 HYPERLIPIDEMIA DUE TO TYPE 2 DIABETES MELLITUS: ICD-10-CM

## 2018-05-16 DIAGNOSIS — M12.812 ROTATOR CUFF ARTHROPATHY, LEFT: ICD-10-CM

## 2018-05-16 PROCEDURE — 3045F PR MOST RECENT HEMOGLOBIN A1C LEVEL 7.0-9.0%: CPT | Mod: CPTII,S$GLB,, | Performed by: FAMILY MEDICINE

## 2018-05-16 PROCEDURE — 99214 OFFICE O/P EST MOD 30 MIN: CPT | Mod: S$GLB,,, | Performed by: FAMILY MEDICINE

## 2018-05-16 PROCEDURE — 3079F DIAST BP 80-89 MM HG: CPT | Mod: CPTII,S$GLB,, | Performed by: FAMILY MEDICINE

## 2018-05-16 PROCEDURE — 3075F SYST BP GE 130 - 139MM HG: CPT | Mod: CPTII,S$GLB,, | Performed by: FAMILY MEDICINE

## 2018-05-16 PROCEDURE — 99999 PR PBB SHADOW E&M-EST. PATIENT-LVL III: CPT | Mod: PBBFAC,,, | Performed by: FAMILY MEDICINE

## 2018-05-16 PROCEDURE — 99499 UNLISTED E&M SERVICE: CPT | Mod: S$PBB,,, | Performed by: FAMILY MEDICINE

## 2018-05-16 NOTE — PROGRESS NOTES
"Subjective:       Patient ID: Lulu Eden is a 73 y.o. female.    Chief Complaint: Establish Care and Follow-up (diabetes)    This pt is new to me.  Pt is here for followup of chronic medical issues.  She stopped her Lipitor because of muscle pain and "it affected my memory."  She checks her glucoses inconsistently.  Recently her fasting was >200.  She "sometimes" watches her diet.  She also reports L shoulderp ain --hurts to raise her arm.  She sees Dr. Zacarias for her hypothyroidism (iatrogenic).  Still sees her thyroid surgeon--pt is 18 months s/p thyroidectomy for cancer.      Review of Systems   Constitutional: Negative for activity change, appetite change, fatigue and unexpected weight change.   Eyes: Negative for visual disturbance.   Respiratory: Negative for cough, chest tightness and shortness of breath.    Cardiovascular: Negative for chest pain, palpitations and leg swelling.   Gastrointestinal: Negative for abdominal pain, constipation, diarrhea, nausea and vomiting.   Endocrine: Negative for cold intolerance, heat intolerance and polyuria.   Genitourinary: Negative for decreased urine volume and dysuria.   Musculoskeletal: Positive for arthralgias (left shoulder). Negative for back pain and gait problem.   Skin: Negative for rash.   Neurological: Negative for numbness and headaches.   Psychiatric/Behavioral: Negative for sleep disturbance.       Objective:       Vitals:    05/16/18 1124   BP: 130/80   BP Location: Right arm   Patient Position: Sitting   BP Method: Medium (Manual)   Pulse: 79   Resp: 18   Temp: 98.3 °F (36.8 °C)   TempSrc: Oral   SpO2: 95%   Weight: 72.9 kg (160 lb 11.5 oz)   Height: 5' 4" (1.626 m)     Physical Exam   Constitutional: She is oriented to person, place, and time. She appears well-developed and well-nourished.   HENT:   Head: Normocephalic.   Eyes: Conjunctivae and EOM are normal. Pupils are equal, round, and reactive to light.   Neck: Normal range of motion. Neck " supple. No thyromegaly present.   Cardiovascular: Normal rate, regular rhythm and normal heart sounds.    Pulmonary/Chest: Effort normal and breath sounds normal.   Abdominal: Soft. Bowel sounds are normal. There is no tenderness.   Musculoskeletal: Normal range of motion. She exhibits no tenderness or deformity.   Mildly weak L rotator cuff--some pain with ROM   Lymphadenopathy:     She has no cervical adenopathy.   Neurological: She is alert and oriented to person, place, and time. She displays normal reflexes. No cranial nerve deficit. She exhibits normal muscle tone. Coordination normal.   Skin: Skin is warm and dry.   Psychiatric: She has a normal mood and affect. Her behavior is normal.       Assessment:       1. Type 2 diabetes mellitus with hyperlipidemia    2. Hypertension associated with diabetes    3. Hyperlipidemia due to type 2 diabetes mellitus    4. Rotator cuff arthropathy, left        Plan:       Lulu was seen today for establish care and follow-up.    Diagnoses and all orders for this visit:    Type 2 diabetes mellitus with hyperlipidemia  -     Hemoglobin A1c; Future  -     Microalbumin/creatinine urine ratio; Future    Hypertension associated with diabetes  -     Comprehensive metabolic panel; Future    Hyperlipidemia due to type 2 diabetes mellitus  -     Lipid panel; Future    Rotator cuff arthropathy, left      During this visit, I reviewed the pt's history, medications, allergies, and problem list.    Showed pt exercises to strengthen rotator cuff

## 2018-05-17 ENCOUNTER — TELEPHONE (OUTPATIENT)
Dept: FAMILY MEDICINE | Facility: CLINIC | Age: 73
End: 2018-05-17

## 2018-05-17 NOTE — TELEPHONE ENCOUNTER
----- Message from Selin Moody sent at 5/17/2018  3:18 PM CDT -----  Contact: pt  Pt states that someone in the office called her and she is returning the call back..[phone]

## 2018-05-22 NOTE — TELEPHONE ENCOUNTER
Patient needed lab appointment schedule before her upcoming appointment. Lab appointment scheduled

## 2018-05-30 ENCOUNTER — TELEPHONE (OUTPATIENT)
Dept: FAMILY MEDICINE | Facility: CLINIC | Age: 73
End: 2018-05-30

## 2018-06-19 DIAGNOSIS — E78.5 TYPE 2 DIABETES MELLITUS WITH HYPERLIPIDEMIA: ICD-10-CM

## 2018-06-19 DIAGNOSIS — E11.69 TYPE 2 DIABETES MELLITUS WITH HYPERLIPIDEMIA: ICD-10-CM

## 2018-06-19 RX ORDER — GLIMEPIRIDE 4 MG/1
TABLET ORAL
Qty: 90 TABLET | Refills: 1 | Status: SHIPPED | OUTPATIENT
Start: 2018-06-19 | End: 2019-01-07 | Stop reason: SDUPTHER

## 2018-07-29 DIAGNOSIS — E11.69 TYPE 2 DIABETES MELLITUS WITH HYPERLIPIDEMIA: ICD-10-CM

## 2018-07-29 DIAGNOSIS — E78.5 TYPE 2 DIABETES MELLITUS WITH HYPERLIPIDEMIA: ICD-10-CM

## 2018-07-31 RX ORDER — SITAGLIPTIN AND METFORMIN HYDROCHLORIDE 1000; 50 MG/1; MG/1
TABLET, FILM COATED ORAL
Qty: 180 TABLET | Refills: 1 | Status: SHIPPED | OUTPATIENT
Start: 2018-07-31 | End: 2018-12-14 | Stop reason: SDUPTHER

## 2018-08-28 ENCOUNTER — PES CALL (OUTPATIENT)
Dept: ADMINISTRATIVE | Facility: CLINIC | Age: 73
End: 2018-08-28

## 2018-11-12 ENCOUNTER — LAB VISIT (OUTPATIENT)
Dept: LAB | Facility: HOSPITAL | Age: 73
End: 2018-11-12
Attending: FAMILY MEDICINE
Payer: MEDICARE

## 2018-11-12 DIAGNOSIS — I15.2 HYPERTENSION ASSOCIATED WITH DIABETES: ICD-10-CM

## 2018-11-12 DIAGNOSIS — E78.5 HYPERLIPIDEMIA DUE TO TYPE 2 DIABETES MELLITUS: ICD-10-CM

## 2018-11-12 DIAGNOSIS — E11.69 HYPERLIPIDEMIA DUE TO TYPE 2 DIABETES MELLITUS: ICD-10-CM

## 2018-11-12 DIAGNOSIS — E11.59 HYPERTENSION ASSOCIATED WITH DIABETES: ICD-10-CM

## 2018-11-12 DIAGNOSIS — E78.5 TYPE 2 DIABETES MELLITUS WITH HYPERLIPIDEMIA: ICD-10-CM

## 2018-11-12 DIAGNOSIS — E11.69 TYPE 2 DIABETES MELLITUS WITH HYPERLIPIDEMIA: ICD-10-CM

## 2018-11-12 LAB
ALBUMIN SERPL BCP-MCNC: 4.2 G/DL
ALP SERPL-CCNC: 54 U/L
ALT SERPL W/O P-5'-P-CCNC: 34 U/L
ANION GAP SERPL CALC-SCNC: 8 MMOL/L
AST SERPL-CCNC: 20 U/L
BILIRUB SERPL-MCNC: 0.8 MG/DL
BUN SERPL-MCNC: 14 MG/DL
CALCIUM SERPL-MCNC: 9.5 MG/DL
CHLORIDE SERPL-SCNC: 103 MMOL/L
CHOLEST SERPL-MCNC: 205 MG/DL
CHOLEST/HDLC SERPL: 4.1 {RATIO}
CO2 SERPL-SCNC: 28 MMOL/L
CREAT SERPL-MCNC: 0.7 MG/DL
EST. GFR  (AFRICAN AMERICAN): >60 ML/MIN/1.73 M^2
EST. GFR  (NON AFRICAN AMERICAN): >60 ML/MIN/1.73 M^2
ESTIMATED AVG GLUCOSE: 166 MG/DL
GLUCOSE SERPL-MCNC: 180 MG/DL
HBA1C MFR BLD HPLC: 7.4 %
HDLC SERPL-MCNC: 50 MG/DL
HDLC SERPL: 24.4 %
LDLC SERPL CALC-MCNC: 122.6 MG/DL
NONHDLC SERPL-MCNC: 155 MG/DL
POTASSIUM SERPL-SCNC: 4.1 MMOL/L
PROT SERPL-MCNC: 6.9 G/DL
SODIUM SERPL-SCNC: 139 MMOL/L
TRIGL SERPL-MCNC: 162 MG/DL

## 2018-11-12 PROCEDURE — 36415 COLL VENOUS BLD VENIPUNCTURE: CPT | Mod: PO

## 2018-11-12 PROCEDURE — 80061 LIPID PANEL: CPT

## 2018-11-12 PROCEDURE — 80053 COMPREHEN METABOLIC PANEL: CPT

## 2018-11-12 PROCEDURE — 83036 HEMOGLOBIN GLYCOSYLATED A1C: CPT

## 2018-12-14 ENCOUNTER — OFFICE VISIT (OUTPATIENT)
Dept: FAMILY MEDICINE | Facility: CLINIC | Age: 73
End: 2018-12-14
Payer: MEDICARE

## 2018-12-14 ENCOUNTER — HOSPITAL ENCOUNTER (OUTPATIENT)
Dept: RADIOLOGY | Facility: HOSPITAL | Age: 73
Discharge: HOME | End: 2018-12-14
Attending: FAMILY MEDICINE
Payer: MEDICARE

## 2018-12-14 VITALS
OXYGEN SATURATION: 96 % | BODY MASS INDEX: 28.04 KG/M2 | HEIGHT: 64 IN | HEART RATE: 75 BPM | TEMPERATURE: 98 F | SYSTOLIC BLOOD PRESSURE: 128 MMHG | DIASTOLIC BLOOD PRESSURE: 86 MMHG | WEIGHT: 164.25 LBS

## 2018-12-14 DIAGNOSIS — E11.69 TYPE 2 DIABETES MELLITUS WITH HYPERLIPIDEMIA: Primary | ICD-10-CM

## 2018-12-14 DIAGNOSIS — E78.5 HYPERLIPIDEMIA DUE TO TYPE 2 DIABETES MELLITUS: ICD-10-CM

## 2018-12-14 DIAGNOSIS — I15.2 HYPERTENSION ASSOCIATED WITH DIABETES: ICD-10-CM

## 2018-12-14 DIAGNOSIS — C73 CANCER OF THYROID: ICD-10-CM

## 2018-12-14 DIAGNOSIS — E78.5 TYPE 2 DIABETES MELLITUS WITH HYPERLIPIDEMIA: Primary | ICD-10-CM

## 2018-12-14 DIAGNOSIS — K21.9 GASTROESOPHAGEAL REFLUX DISEASE, ESOPHAGITIS PRESENCE NOT SPECIFIED: ICD-10-CM

## 2018-12-14 DIAGNOSIS — E03.9 HYPOTHYROIDISM, UNSPECIFIED TYPE: ICD-10-CM

## 2018-12-14 DIAGNOSIS — E11.69 HYPERLIPIDEMIA DUE TO TYPE 2 DIABETES MELLITUS: ICD-10-CM

## 2018-12-14 DIAGNOSIS — Z78.0 POST-MENOPAUSAL: ICD-10-CM

## 2018-12-14 DIAGNOSIS — E11.59 HYPERTENSION ASSOCIATED WITH DIABETES: ICD-10-CM

## 2018-12-14 PROCEDURE — 3074F SYST BP LT 130 MM HG: CPT | Mod: CPTII,S$GLB,, | Performed by: FAMILY MEDICINE

## 2018-12-14 PROCEDURE — 99214 OFFICE O/P EST MOD 30 MIN: CPT | Mod: S$GLB,,, | Performed by: FAMILY MEDICINE

## 2018-12-14 PROCEDURE — 77080 DXA BONE DENSITY AXIAL: CPT | Mod: TC,PO

## 2018-12-14 PROCEDURE — 77080 DXA BONE DENSITY AXIAL: CPT | Mod: 26,,, | Performed by: RADIOLOGY

## 2018-12-14 PROCEDURE — 3079F DIAST BP 80-89 MM HG: CPT | Mod: CPTII,S$GLB,, | Performed by: FAMILY MEDICINE

## 2018-12-14 PROCEDURE — 3045F PR MOST RECENT HEMOGLOBIN A1C LEVEL 7.0-9.0%: CPT | Mod: CPTII,S$GLB,, | Performed by: FAMILY MEDICINE

## 2018-12-14 PROCEDURE — 99999 PR PBB SHADOW E&M-EST. PATIENT-LVL III: CPT | Mod: PBBFAC,,, | Performed by: FAMILY MEDICINE

## 2018-12-14 PROCEDURE — 3288F FALL RISK ASSESSMENT DOCD: CPT | Mod: CPTII,S$GLB,, | Performed by: FAMILY MEDICINE

## 2018-12-14 PROCEDURE — 99499 UNLISTED E&M SERVICE: CPT | Mod: S$GLB,,, | Performed by: FAMILY MEDICINE

## 2018-12-14 PROCEDURE — 1100F PTFALLS ASSESS-DOCD GE2>/YR: CPT | Mod: CPTII,S$GLB,, | Performed by: FAMILY MEDICINE

## 2018-12-14 NOTE — PROGRESS NOTES
"Subjective:       Patient ID: Lulu Eden is a 73 y.o. female.    Chief Complaint: Follow-up    Pt is known to me.  Pt is here for followup of chronic medical issues.  Her glucoses have been a little high in the mornings.  She gets her glucoses down when she watches her diet.  She walks every day for exercise.  The pt's thyroid is followed by her endocrinologist--Dr. Zacarias.  She is s/p papillary thyroid cancer.      Review of Systems   Constitutional: Negative for activity change, appetite change, fatigue and unexpected weight change.   Eyes: Negative for visual disturbance.   Respiratory: Negative for cough, chest tightness and shortness of breath.    Cardiovascular: Negative for chest pain, palpitations and leg swelling.   Gastrointestinal: Positive for diarrhea (when she strays from her diet). Negative for abdominal pain, constipation, nausea and vomiting.   Endocrine: Negative for cold intolerance, heat intolerance and polyuria.   Genitourinary: Negative for decreased urine volume and dysuria.   Musculoskeletal: Negative for back pain and gait problem.   Skin: Negative for rash.   Neurological: Negative for numbness and headaches.   Psychiatric/Behavioral: Negative for sleep disturbance.       Objective:       Vitals:    12/14/18 0904   BP: 128/86   Pulse: 75   Temp: 98 °F (36.7 °C)   SpO2: 96%   Weight: 74.5 kg (164 lb 3.9 oz)   Height: 5' 4" (1.626 m)     Physical Exam   Constitutional: She is oriented to person, place, and time. She appears well-developed and well-nourished.   HENT:   Head: Normocephalic.   Eyes: Conjunctivae and EOM are normal. Pupils are equal, round, and reactive to light.   Neck: Normal range of motion. Neck supple. No thyromegaly present.   Cardiovascular: Normal rate, regular rhythm and normal heart sounds.   Pulses:       Dorsalis pedis pulses are 2+ on the right side, and 2+ on the left side.        Posterior tibial pulses are 2+ on the right side, and 2+ on the left side. "   Pulmonary/Chest: Effort normal and breath sounds normal.   Abdominal: Soft. Bowel sounds are normal. There is no tenderness.   Musculoskeletal: Normal range of motion. She exhibits no tenderness or deformity.        Right foot: There is normal range of motion and no deformity.        Left foot: There is normal range of motion and no deformity.   Feet:   Right Foot:   Protective Sensation: 7 sites tested. 7 sites sensed.   Skin Integrity: Negative for ulcer.   Left Foot:   Protective Sensation: 7 sites tested. 7 sites sensed.   Skin Integrity: Negative for ulcer.   Lymphadenopathy:     She has no cervical adenopathy.   Neurological: She is alert and oriented to person, place, and time. She displays normal reflexes. No cranial nerve deficit. She exhibits normal muscle tone. Coordination normal.   Skin: Skin is warm and dry.   Psychiatric: She has a normal mood and affect. Her behavior is normal.       Assessment:       1. Type 2 diabetes mellitus with hyperlipidemia    2. Post-menopausal    3. Hypothyroidism, unspecified type    4. Gastroesophageal reflux disease, esophagitis presence not specified    5. Hypertension associated with diabetes    6. Hyperlipidemia due to type 2 diabetes mellitus    7. Cancer of thyroid        Plan:       Lulu was seen today for follow-up.    Diagnoses and all orders for this visit:    Type 2 diabetes mellitus with hyperlipidemia  -     SITagliptan-metformin (JANUMET) 50-1,000 mg per tablet; Take 1 tablet by mouth 2 (two) times daily with meals.    Post-menopausal  -     DXA Bone Density Spine And Hip; Future    Hypothyroidism, unspecified type    Gastroesophageal reflux disease, esophagitis presence not specified    Hypertension associated with diabetes    Hyperlipidemia due to type 2 diabetes mellitus    Cancer of thyroid      During this visit, I reviewed the pt's history, medications, allergies, and problem list.

## 2019-01-05 ENCOUNTER — PATIENT MESSAGE (OUTPATIENT)
Dept: FAMILY MEDICINE | Facility: CLINIC | Age: 74
End: 2019-01-05

## 2019-01-07 DIAGNOSIS — E78.5 TYPE 2 DIABETES MELLITUS WITH HYPERLIPIDEMIA: ICD-10-CM

## 2019-01-07 DIAGNOSIS — E11.69 TYPE 2 DIABETES MELLITUS WITH HYPERLIPIDEMIA: ICD-10-CM

## 2019-01-07 RX ORDER — GLIMEPIRIDE 4 MG/1
TABLET ORAL
Qty: 90 TABLET | Refills: 1 | Status: SHIPPED | OUTPATIENT
Start: 2019-01-07 | End: 2019-07-09 | Stop reason: SDUPTHER

## 2019-01-07 RX ORDER — ATENOLOL 50 MG/1
50 TABLET ORAL DAILY
Qty: 90 TABLET | Refills: 3 | Status: SHIPPED | OUTPATIENT
Start: 2019-01-07 | End: 2020-01-06

## 2019-04-25 ENCOUNTER — TELEPHONE (OUTPATIENT)
Dept: FAMILY MEDICINE | Facility: CLINIC | Age: 74
End: 2019-04-25

## 2019-04-25 DIAGNOSIS — T78.40XS ALLERGIC STATE, SEQUELA: Primary | ICD-10-CM

## 2019-04-25 RX ORDER — EPINEPHRINE 0.3 MG/.3ML
1 INJECTION SUBCUTANEOUS ONCE
Qty: 2 EACH | Refills: 11 | Status: SHIPPED | OUTPATIENT
Start: 2019-04-25 | End: 2021-07-16
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
## 2019-04-25 NOTE — TELEPHONE ENCOUNTER
Patient is needing new Rx for Epi-Pen sent to [facility] pharmacy in [place] on [address]. Please advise.  LOV: 12/14/2018

## 2019-05-31 ENCOUNTER — PATIENT OUTREACH (OUTPATIENT)
Dept: ADMINISTRATIVE | Facility: HOSPITAL | Age: 74
End: 2019-05-31

## 2019-05-31 NOTE — PROGRESS NOTES
Health Maintenance Due   Topic Date Due    Hemoglobin A1c  05/12/2019    Eye Exam  05/15/2019       Chart review complete/scrubbed 05/31/2019

## 2019-05-31 NOTE — LETTER
Jennifer 10, 2019    Lulu Eden  [address] [place] [address]             [facility]  [address]  [facility] [address]  Phone: [phone] Dear Mrs. Eden:    We have tried to reach you by mychart unsuccessfully.    [facility] is committed to your overall health.  To help you get the most out of each of your visits, we will review your information to make sure you are up to date on all of your recommended tests and/or procedures.       BARNEY Roman MD  has found that your chart shows you may be due for the following:     Hemoglobin A1c   Eye Exam     If you have had any of the above done at another facility, please bring the records with you or FAX them to [phone] so that your record at [facility] will be complete.  If you have not had any of these tests or procedures done recently and would like to complete this testing, please call [phone] or send a message through your [facility] portal to your provider's office.     If you have an upcoming scheduled appointment for the above test and/or procedures, please disregard this letter.     Rosemary CORLEY, Panel Care Coordinator, [phone]         If you have any questions or concerns, please don't hesitate to call.

## 2019-06-14 ENCOUNTER — OFFICE VISIT (OUTPATIENT)
Dept: FAMILY MEDICINE | Facility: CLINIC | Age: 74
End: 2019-06-14
Payer: MEDICARE

## 2019-06-14 ENCOUNTER — HOSPITAL ENCOUNTER (OUTPATIENT)
Dept: RADIOLOGY | Facility: CLINIC | Age: 74
Discharge: HOME | End: 2019-06-14
Attending: FAMILY MEDICINE
Payer: MEDICARE

## 2019-06-14 VITALS
HEART RATE: 68 BPM | HEIGHT: 64 IN | DIASTOLIC BLOOD PRESSURE: 82 MMHG | BODY MASS INDEX: 27.67 KG/M2 | WEIGHT: 162.06 LBS | SYSTOLIC BLOOD PRESSURE: 122 MMHG

## 2019-06-14 DIAGNOSIS — E11.59 HYPERTENSION ASSOCIATED WITH DIABETES: ICD-10-CM

## 2019-06-14 DIAGNOSIS — E78.5 HYPERLIPIDEMIA DUE TO TYPE 2 DIABETES MELLITUS: ICD-10-CM

## 2019-06-14 DIAGNOSIS — E11.69 HYPERLIPIDEMIA DUE TO TYPE 2 DIABETES MELLITUS: ICD-10-CM

## 2019-06-14 DIAGNOSIS — E78.5 TYPE 2 DIABETES MELLITUS WITH HYPERLIPIDEMIA: Primary | ICD-10-CM

## 2019-06-14 DIAGNOSIS — C73 CANCER OF THYROID: ICD-10-CM

## 2019-06-14 DIAGNOSIS — I15.2 HYPERTENSION ASSOCIATED WITH DIABETES: ICD-10-CM

## 2019-06-14 DIAGNOSIS — Z12.39 BREAST CANCER SCREENING: ICD-10-CM

## 2019-06-14 DIAGNOSIS — E11.69 TYPE 2 DIABETES MELLITUS WITH HYPERLIPIDEMIA: Primary | ICD-10-CM

## 2019-06-14 DIAGNOSIS — E11.65 UNCONTROLLED TYPE 2 DIABETES MELLITUS WITH HYPERGLYCEMIA: ICD-10-CM

## 2019-06-14 DIAGNOSIS — E89.0 POSTOPERATIVE HYPOTHYROIDISM: ICD-10-CM

## 2019-06-14 PROCEDURE — 99499 UNLISTED E&M SERVICE: CPT | Mod: HCNC,S$GLB,, | Performed by: FAMILY MEDICINE

## 2019-06-14 PROCEDURE — 77067 SCR MAMMO BI INCL CAD: CPT | Mod: 26,,, | Performed by: RADIOLOGY

## 2019-06-14 PROCEDURE — 1100F PR PT FALLS ASSESS DOC 2+ FALLS/FALL W/INJURY/YR: ICD-10-PCS | Mod: CPTII,S$GLB,, | Performed by: FAMILY MEDICINE

## 2019-06-14 PROCEDURE — 77067 MAMMO DIGITAL SCREENING BILAT WITH TOMOSYNTHESIS_CAD: ICD-10-PCS | Mod: 26,,, | Performed by: RADIOLOGY

## 2019-06-14 PROCEDURE — 3074F PR MOST RECENT SYSTOLIC BLOOD PRESSURE < 130 MM HG: ICD-10-PCS | Mod: CPTII,S$GLB,, | Performed by: FAMILY MEDICINE

## 2019-06-14 PROCEDURE — 3288F PR FALLS RISK ASSESSMENT DOCUMENTED: ICD-10-PCS | Mod: CPTII,S$GLB,, | Performed by: FAMILY MEDICINE

## 2019-06-14 PROCEDURE — 99214 PR OFFICE/OUTPT VISIT, EST, LEVL IV, 30-39 MIN: ICD-10-PCS | Mod: S$GLB,,, | Performed by: FAMILY MEDICINE

## 2019-06-14 PROCEDURE — 99999 PR PBB SHADOW E&M-EST. PATIENT-LVL III: ICD-10-PCS | Mod: PBBFAC,,, | Performed by: FAMILY MEDICINE

## 2019-06-14 PROCEDURE — 3074F SYST BP LT 130 MM HG: CPT | Mod: CPTII,S$GLB,, | Performed by: FAMILY MEDICINE

## 2019-06-14 PROCEDURE — 77067 SCR MAMMO BI INCL CAD: CPT | Mod: TC,PO

## 2019-06-14 PROCEDURE — 99499 RISK ADDL DX/OHS AUDIT: ICD-10-PCS | Mod: HCNC,S$GLB,, | Performed by: FAMILY MEDICINE

## 2019-06-14 PROCEDURE — 99999 PR PBB SHADOW E&M-EST. PATIENT-LVL III: CPT | Mod: PBBFAC,,, | Performed by: FAMILY MEDICINE

## 2019-06-14 PROCEDURE — 3079F DIAST BP 80-89 MM HG: CPT | Mod: CPTII,S$GLB,, | Performed by: FAMILY MEDICINE

## 2019-06-14 PROCEDURE — 77063 MAMMO DIGITAL SCREENING BILAT WITH TOMOSYNTHESIS_CAD: ICD-10-PCS | Mod: 26,,, | Performed by: RADIOLOGY

## 2019-06-14 PROCEDURE — 77063 BREAST TOMOSYNTHESIS BI: CPT | Mod: 26,,, | Performed by: RADIOLOGY

## 2019-06-14 PROCEDURE — 3288F FALL RISK ASSESSMENT DOCD: CPT | Mod: CPTII,S$GLB,, | Performed by: FAMILY MEDICINE

## 2019-06-14 PROCEDURE — 3079F PR MOST RECENT DIASTOLIC BLOOD PRESSURE 80-89 MM HG: ICD-10-PCS | Mod: CPTII,S$GLB,, | Performed by: FAMILY MEDICINE

## 2019-06-14 PROCEDURE — 1100F PTFALLS ASSESS-DOCD GE2>/YR: CPT | Mod: CPTII,S$GLB,, | Performed by: FAMILY MEDICINE

## 2019-06-14 PROCEDURE — 99214 OFFICE O/P EST MOD 30 MIN: CPT | Mod: S$GLB,,, | Performed by: FAMILY MEDICINE

## 2019-06-14 RX ORDER — LEVOTHYROXINE SODIUM 100 UG/1
TABLET ORAL
Refills: 11 | COMMUNITY
Start: 2019-05-02

## 2019-06-14 NOTE — PROGRESS NOTES
"Subjective:       Patient ID: Lulu Eden is a 74 y.o. female.    Chief Complaint: Follow-up (6 month)    Pt is known to me.  Pt is here for followup of chronic medical issues.  The pt had an anaphylactic reaction to a wasp sting about 3 months ago--she now has an EpiPen.  She reports that her glucoses are running high.  She says she is trying to watch her diet.  She is walking 15 minutes every day.  She continues to see her endocrinologist for her thyroid.  She is on both Synthroid and Cytomel.  She is soon to have a scan per her thyroid surgeon.  The pt needs to make an appointment with her eye doctor.    Review of Systems   Constitutional: Positive for fatigue (she thinks due to her thyroid). Negative for activity change, appetite change and unexpected weight change.   Eyes: Negative for visual disturbance.   Respiratory: Negative for cough, chest tightness and shortness of breath.    Cardiovascular: Negative for chest pain, palpitations and leg swelling.   Gastrointestinal: Negative for abdominal pain, constipation, diarrhea, nausea and vomiting.   Endocrine: Negative for cold intolerance, heat intolerance and polyuria.   Genitourinary: Negative for decreased urine volume and dysuria.   Musculoskeletal: Negative for arthralgias and back pain.   Skin: Negative for rash.   Neurological: Negative for numbness and headaches.       Objective:       Vitals:    06/14/19 0949   BP: 122/82   BP Location: Right arm   Patient Position: Sitting   BP Method: Medium (Manual)   Pulse: 68   Weight: 73.5 kg (162 lb 0.6 oz)   Height: 5' 4" (1.626 m)     Physical Exam   Constitutional: She is oriented to person, place, and time. She appears well-developed and well-nourished.   HENT:   Head: Normocephalic.   Eyes: Pupils are equal, round, and reactive to light. Conjunctivae and EOM are normal.   Neck: Normal range of motion. Neck supple. No thyromegaly present.   Cardiovascular: Normal rate, regular rhythm and normal heart " sounds.   Pulmonary/Chest: Effort normal and breath sounds normal.   Abdominal: Soft. Bowel sounds are normal. There is no tenderness.   Musculoskeletal: Normal range of motion. She exhibits no tenderness or deformity.   Lymphadenopathy:     She has no cervical adenopathy.   Neurological: She is alert and oriented to person, place, and time. She displays normal reflexes. No cranial nerve deficit. She exhibits normal muscle tone. Coordination normal.   Skin: Skin is warm and dry.   Psychiatric: She has a normal mood and affect. Her behavior is normal.       Assessment:       1. Type 2 diabetes mellitus with hyperlipidemia    2. Postoperative hypothyroidism    3. Uncontrolled type 2 diabetes mellitus with hyperglycemia    4. Hyperlipidemia due to type 2 diabetes mellitus    5. Hypertension associated with diabetes    6. Cancer of thyroid    7. Breast cancer screening        Plan:       Lulu was seen today for follow-up.    Diagnoses and all orders for this visit:    Type 2 diabetes mellitus with hyperlipidemia  -     Hemoglobin A1c; Future  -     SITagliptan-metformin (JANUMET) 50-1,000 mg per tablet; Take 1 tablet by mouth 2 (two) times daily with meals.    Postoperative hypothyroidism    Uncontrolled type 2 diabetes mellitus with hyperglycemia    Hyperlipidemia due to type 2 diabetes mellitus    Hypertension associated with diabetes    Cancer of thyroid    Breast cancer screening  -     Mammo Digital Screening Bilateral With CAD; Future      During this visit, I reviewed the pt's history, medications, allergies, and problem list.

## 2019-07-09 DIAGNOSIS — E11.69 TYPE 2 DIABETES MELLITUS WITH HYPERLIPIDEMIA: ICD-10-CM

## 2019-07-09 DIAGNOSIS — E78.5 TYPE 2 DIABETES MELLITUS WITH HYPERLIPIDEMIA: ICD-10-CM

## 2019-07-10 RX ORDER — GLIMEPIRIDE 4 MG/1
TABLET ORAL
Qty: 90 TABLET | Refills: 1 | Status: SHIPPED | OUTPATIENT
Start: 2019-07-10 | End: 2020-01-06 | Stop reason: SDUPTHER

## 2019-08-28 ENCOUNTER — PES CALL (OUTPATIENT)
Dept: ADMINISTRATIVE | Facility: CLINIC | Age: 74
End: 2019-08-28

## 2019-08-29 ENCOUNTER — OFFICE VISIT (OUTPATIENT)
Dept: FAMILY MEDICINE | Facility: CLINIC | Age: 74
End: 2019-08-29
Payer: MEDICARE

## 2019-08-29 VITALS
SYSTOLIC BLOOD PRESSURE: 122 MMHG | DIASTOLIC BLOOD PRESSURE: 76 MMHG | WEIGHT: 164.88 LBS | HEART RATE: 72 BPM | OXYGEN SATURATION: 95 % | BODY MASS INDEX: 28.15 KG/M2 | HEIGHT: 64 IN

## 2019-08-29 DIAGNOSIS — E21.3 HYPERPARATHYROIDISM: ICD-10-CM

## 2019-08-29 DIAGNOSIS — D35.1 PARATHYROID ADENOMA: ICD-10-CM

## 2019-08-29 DIAGNOSIS — E11.69 TYPE 2 DIABETES MELLITUS WITH HYPERLIPIDEMIA: ICD-10-CM

## 2019-08-29 DIAGNOSIS — I70.0 AORTIC ATHEROSCLEROSIS: ICD-10-CM

## 2019-08-29 DIAGNOSIS — E78.5 TYPE 2 DIABETES MELLITUS WITH HYPERLIPIDEMIA: ICD-10-CM

## 2019-08-29 DIAGNOSIS — C73 CANCER OF THYROID: ICD-10-CM

## 2019-08-29 DIAGNOSIS — E08.8 DIABETES MELLITUS DUE TO UNDERLYING CONDITION WITH COMPLICATION, WITHOUT LONG-TERM CURRENT USE OF INSULIN: ICD-10-CM

## 2019-08-29 DIAGNOSIS — G47.33 OBSTRUCTIVE SLEEP APNEA: ICD-10-CM

## 2019-08-29 DIAGNOSIS — E11.59 HYPERTENSION ASSOCIATED WITH DIABETES: ICD-10-CM

## 2019-08-29 DIAGNOSIS — I15.2 HYPERTENSION ASSOCIATED WITH DIABETES: ICD-10-CM

## 2019-08-29 DIAGNOSIS — Z00.00 ENCOUNTER FOR PREVENTIVE HEALTH EXAMINATION: Primary | ICD-10-CM

## 2019-08-29 DIAGNOSIS — E11.65 UNCONTROLLED TYPE 2 DIABETES MELLITUS WITH HYPERGLYCEMIA: ICD-10-CM

## 2019-08-29 PROBLEM — R07.89 ACUTE CHEST WALL PAIN: Status: RESOLVED | Noted: 2017-11-16 | Resolved: 2019-08-29

## 2019-08-29 PROCEDURE — 99499 RISK ADDL DX/OHS AUDIT: ICD-10-PCS | Mod: S$GLB,,, | Performed by: NURSE PRACTITIONER

## 2019-08-29 PROCEDURE — 3078F PR MOST RECENT DIASTOLIC BLOOD PRESSURE < 80 MM HG: ICD-10-PCS | Mod: HCNC,CPTII,S$GLB, | Performed by: NURSE PRACTITIONER

## 2019-08-29 PROCEDURE — 3078F DIAST BP <80 MM HG: CPT | Mod: HCNC,CPTII,S$GLB, | Performed by: NURSE PRACTITIONER

## 2019-08-29 PROCEDURE — G0439 PR MEDICARE ANNUAL WELLNESS SUBSEQUENT VISIT: ICD-10-PCS | Mod: HCNC,S$GLB,, | Performed by: NURSE PRACTITIONER

## 2019-08-29 PROCEDURE — G0439 PPPS, SUBSEQ VISIT: HCPCS | Mod: HCNC,S$GLB,, | Performed by: NURSE PRACTITIONER

## 2019-08-29 PROCEDURE — 3074F PR MOST RECENT SYSTOLIC BLOOD PRESSURE < 130 MM HG: ICD-10-PCS | Mod: HCNC,CPTII,S$GLB, | Performed by: NURSE PRACTITIONER

## 2019-08-29 PROCEDURE — 99999 PR PBB SHADOW E&M-EST. PATIENT-LVL IV: ICD-10-PCS | Mod: PBBFAC,HCNC,, | Performed by: NURSE PRACTITIONER

## 2019-08-29 PROCEDURE — 99999 PR PBB SHADOW E&M-EST. PATIENT-LVL IV: CPT | Mod: PBBFAC,HCNC,, | Performed by: NURSE PRACTITIONER

## 2019-08-29 PROCEDURE — 3074F SYST BP LT 130 MM HG: CPT | Mod: HCNC,CPTII,S$GLB, | Performed by: NURSE PRACTITIONER

## 2019-08-29 PROCEDURE — 99499 UNLISTED E&M SERVICE: CPT | Mod: S$GLB,,, | Performed by: NURSE PRACTITIONER

## 2019-08-29 RX ORDER — LIOTHYRONINE SODIUM 5 UG/1
5 TABLET ORAL DAILY
Refills: 5 | COMMUNITY
Start: 2019-08-04

## 2019-08-29 NOTE — PROGRESS NOTES
"Lulu Eden presented for a  Medicare AWV and comprehensive Health Risk Assessment today. The following components were reviewed and updated:    · Medical history  · Family History  · Social history  · Allergies and Current Medications  · Health Risk Assessment  · Health Maintenance  · Care Team     ** See Completed Assessments for Annual Wellness Visit within the encounter summary.**       The following assessments were completed:  · Living Situation  · CAGE  · Depression Screening  · Timed Get Up and Go  · Whisper Test  · Cognitive Function Screening          · Nutrition Screening  · ADL Screening  · PAQ Screening    Vitals:    08/29/19 1258   BP: 122/76   BP Location: Left arm   Patient Position: Sitting   BP Method: Medium (Manual)   Pulse: 72   SpO2: 95%   Weight: 74.8 kg (164 lb 14.5 oz)   Height: 5' 4" (1.626 m)     Body mass index is 28.31 kg/m².  Physical Exam   Constitutional: She is oriented to person, place, and time. She appears well-developed and well-nourished.   HENT:   Head: Normocephalic.   Eyes: Conjunctivae are normal.   Neck: Neck supple.   Cardiovascular: Normal rate, regular rhythm and normal heart sounds.   No murmur heard.  Pulmonary/Chest: Effort normal and breath sounds normal. No respiratory distress.   Musculoskeletal: Normal range of motion.   Neurological: She is alert and oriented to person, place, and time. No cranial nerve deficit. She exhibits normal muscle tone. Coordination normal.   Skin: Skin is warm and dry.   Psychiatric: She has a normal mood and affect. Her behavior is normal.         Diagnoses and health risks identified today and associated recommendations/orders:    1. Encounter for preventive health examination  Reviewed health maintenance and provided recommendations    Pt will schedule eye exam with Dr. Perdomo    2. Hypertension associated with diabetes  Continue to monitor  Followed by BARNEY Roman MD .      3. Parathyroid adenoma  Continue to " monitor  Followed by edilberto.      4. Cancer of thyroid  Continue to monitor  Followed by Edilberto.   Follow endo recommendations      5. Type 2 diabetes mellitus with hyperlipidemia  Continue to monitor  Followed by BARNEY Roman MD   Last a1c 8.2  Encourage healthy food choices     6. Diabetes mellitus due to underlying condition with complication, without long-term current use of insulin  Continue to monitor  Followed by BARNEY Roman MD .   Taking janumet     7. Uncontrolled type 2 diabetes mellitus with hyperglycemia  Continue to monitor  Followed by BARNEY Roman MD .      8. Obstructive sleep apnea  Continue to monitor  Followed by BARNEY Roman MD .      9. Hyperparathyroidism  Continue to monitor  Followed by Edilberto.      10.  Aortic atherosclerosis  Continue to monitor  Followed by BARNEY Roman MD .    CXR 10/21/16      Provided Lulu with a 5-10 year written screening schedule and personal prevention plan. Recommendations were developed using the USPSTF age appropriate recommendations. Education, counseling, and referrals were provided as needed. After Visit Summary printed and given to patient which includes a list of additional screenings\tests needed.    Follow up in about 1 year (around 8/29/2020).    Simin Valencia NP  I offered to discuss end of life issues, including information on how to make advance directives that the patient could use to name someone who would make medical decisions on their behalf if they became too ill to make themselves.    ___Patient declined  _X_Patient is interested, I provided paper work and offered to discuss.

## 2019-08-29 NOTE — PATIENT INSTRUCTIONS
Counseling and Referral of Other Preventative  (Italic type indicates deductible and co-insurance are waived)    Patient Name: Lulu Eden  Today's Date: 8/29/2019    Health Maintenance       Date Due Completion Date    Eye Exam 05/15/2019 5/15/2018    Shingles Vaccine (1 of 2) 08/29/2020 (Originally 4/[birth year]/1993) ---    Influenza Vaccine (1) 09/01/2019 11/16/2017 (Declined)    Override on 11/16/2017: Declined    Override on 9/6/2016: Declined    Override on 12/1/2015: Declined    Lipid Panel 11/12/2019 11/12/2018    Urine Microalbumin 11/12/2019 11/12/2018    Foot Exam 12/14/2019 12/14/2018    Override on 7/9/2015: Done    Hemoglobin A1c 12/14/2019 6/14/2019    DEXA SCAN 12/14/2021 12/14/2018    TETANUS VACCINE 09/06/2026 9/6/2016        No orders of the defined types were placed in this encounter.    The following information is provided to all patients.  This information is to help you find resources for any of the problems found today that may be affecting your health:                Living healthy guide: www.[facility].[place].gov      Understanding Diabetes: www.diabetes.org      Eating healthy: www.cdc.gov/healthyweight      CDC home safety checklist: www.cdc.gov/steadi/patient.html      Agency on Aging: www.goea.[place].[facility]      Alcoholics anonymous (AA): www.aa.org      Physical Activity: www.chapin.nih.gov/xn7trdc      Tobacco use: www.quitwithusla.org

## 2019-09-03 PROBLEM — I70.0 AORTIC ATHEROSCLEROSIS: Status: ACTIVE | Noted: 2019-09-03

## 2019-09-03 PROBLEM — E21.3 HYPERPARATHYROIDISM: Status: ACTIVE | Noted: 2019-09-03

## 2019-12-19 ENCOUNTER — LAB VISIT (OUTPATIENT)
Dept: LAB | Facility: HOSPITAL | Age: 74
End: 2019-12-19
Attending: FAMILY MEDICINE
Payer: MEDICARE

## 2019-12-19 ENCOUNTER — OFFICE VISIT (OUTPATIENT)
Dept: FAMILY MEDICINE | Facility: CLINIC | Age: 74
End: 2019-12-19
Payer: MEDICARE

## 2019-12-19 VITALS
WEIGHT: 162.69 LBS | HEIGHT: 64 IN | BODY MASS INDEX: 27.77 KG/M2 | OXYGEN SATURATION: 98 % | DIASTOLIC BLOOD PRESSURE: 86 MMHG | HEART RATE: 75 BPM | SYSTOLIC BLOOD PRESSURE: 120 MMHG

## 2019-12-19 DIAGNOSIS — G47.33 OBSTRUCTIVE SLEEP APNEA: ICD-10-CM

## 2019-12-19 DIAGNOSIS — E78.5 TYPE 2 DIABETES MELLITUS WITH HYPERLIPIDEMIA: ICD-10-CM

## 2019-12-19 DIAGNOSIS — I15.2 HYPERTENSION ASSOCIATED WITH DIABETES: ICD-10-CM

## 2019-12-19 DIAGNOSIS — E78.5 TYPE 2 DIABETES MELLITUS WITH HYPERLIPIDEMIA: Primary | ICD-10-CM

## 2019-12-19 DIAGNOSIS — E11.59 HYPERTENSION ASSOCIATED WITH DIABETES: ICD-10-CM

## 2019-12-19 DIAGNOSIS — E11.69 TYPE 2 DIABETES MELLITUS WITH HYPERLIPIDEMIA: ICD-10-CM

## 2019-12-19 DIAGNOSIS — E11.69 TYPE 2 DIABETES MELLITUS WITH HYPERLIPIDEMIA: Primary | ICD-10-CM

## 2019-12-19 DIAGNOSIS — E89.0 POSTOPERATIVE HYPOTHYROIDISM: ICD-10-CM

## 2019-12-19 LAB
ALBUMIN SERPL BCP-MCNC: 4.4 G/DL (ref 3.5–5.2)
ALP SERPL-CCNC: 67 U/L (ref 55–135)
ALT SERPL W/O P-5'-P-CCNC: 30 U/L (ref 10–44)
ANION GAP SERPL CALC-SCNC: 7 MMOL/L (ref 8–16)
AST SERPL-CCNC: 19 U/L (ref 10–40)
BILIRUB SERPL-MCNC: 0.9 MG/DL (ref 0.1–1)
BUN SERPL-MCNC: 15 MG/DL (ref 8–23)
CALCIUM SERPL-MCNC: 9.4 MG/DL (ref 8.7–10.5)
CHLORIDE SERPL-SCNC: 101 MMOL/L (ref 95–110)
CHOLEST SERPL-MCNC: 244 MG/DL (ref 120–199)
CHOLEST/HDLC SERPL: 4.1 {RATIO} (ref 2–5)
CO2 SERPL-SCNC: 29 MMOL/L (ref 23–29)
CREAT SERPL-MCNC: 0.7 MG/DL (ref 0.5–1.4)
EST. GFR  (AFRICAN AMERICAN): >60 ML/MIN/1.73 M^2
EST. GFR  (NON AFRICAN AMERICAN): >60 ML/MIN/1.73 M^2
ESTIMATED AVG GLUCOSE: 235 MG/DL (ref 68–131)
GLUCOSE SERPL-MCNC: 228 MG/DL (ref 70–110)
HBA1C MFR BLD HPLC: 9.8 % (ref 4–5.6)
HDLC SERPL-MCNC: 59 MG/DL (ref 40–75)
HDLC SERPL: 24.2 % (ref 20–50)
LDLC SERPL CALC-MCNC: 144 MG/DL (ref 63–159)
NONHDLC SERPL-MCNC: 185 MG/DL
POTASSIUM SERPL-SCNC: 4.2 MMOL/L (ref 3.5–5.1)
PROT SERPL-MCNC: 7.2 G/DL (ref 6–8.4)
SODIUM SERPL-SCNC: 137 MMOL/L (ref 136–145)
TRIGL SERPL-MCNC: 205 MG/DL (ref 30–150)

## 2019-12-19 PROCEDURE — 99214 PR OFFICE/OUTPT VISIT, EST, LEVL IV, 30-39 MIN: ICD-10-PCS | Mod: HCNC,S$GLB,, | Performed by: FAMILY MEDICINE

## 2019-12-19 PROCEDURE — 3079F PR MOST RECENT DIASTOLIC BLOOD PRESSURE 80-89 MM HG: ICD-10-PCS | Mod: HCNC,CPTII,S$GLB, | Performed by: FAMILY MEDICINE

## 2019-12-19 PROCEDURE — 3074F PR MOST RECENT SYSTOLIC BLOOD PRESSURE < 130 MM HG: ICD-10-PCS | Mod: HCNC,CPTII,S$GLB, | Performed by: FAMILY MEDICINE

## 2019-12-19 PROCEDURE — 1159F PR MEDICATION LIST DOCUMENTED IN MEDICAL RECORD: ICD-10-PCS | Mod: HCNC,S$GLB,, | Performed by: FAMILY MEDICINE

## 2019-12-19 PROCEDURE — 80061 LIPID PANEL: CPT | Mod: HCNC

## 2019-12-19 PROCEDURE — 3079F DIAST BP 80-89 MM HG: CPT | Mod: HCNC,CPTII,S$GLB, | Performed by: FAMILY MEDICINE

## 2019-12-19 PROCEDURE — 83036 HEMOGLOBIN GLYCOSYLATED A1C: CPT | Mod: HCNC

## 2019-12-19 PROCEDURE — 80053 COMPREHEN METABOLIC PANEL: CPT | Mod: HCNC

## 2019-12-19 PROCEDURE — 3288F PR FALLS RISK ASSESSMENT DOCUMENTED: ICD-10-PCS | Mod: HCNC,CPTII,S$GLB, | Performed by: FAMILY MEDICINE

## 2019-12-19 PROCEDURE — 1159F MED LIST DOCD IN RCRD: CPT | Mod: HCNC,S$GLB,, | Performed by: FAMILY MEDICINE

## 2019-12-19 PROCEDURE — 1126F AMNT PAIN NOTED NONE PRSNT: CPT | Mod: HCNC,S$GLB,, | Performed by: FAMILY MEDICINE

## 2019-12-19 PROCEDURE — 3288F FALL RISK ASSESSMENT DOCD: CPT | Mod: HCNC,CPTII,S$GLB, | Performed by: FAMILY MEDICINE

## 2019-12-19 PROCEDURE — 99999 PR PBB SHADOW E&M-EST. PATIENT-LVL III: CPT | Mod: PBBFAC,HCNC,, | Performed by: FAMILY MEDICINE

## 2019-12-19 PROCEDURE — 1100F PTFALLS ASSESS-DOCD GE2>/YR: CPT | Mod: HCNC,CPTII,S$GLB, | Performed by: FAMILY MEDICINE

## 2019-12-19 PROCEDURE — 99214 OFFICE O/P EST MOD 30 MIN: CPT | Mod: HCNC,S$GLB,, | Performed by: FAMILY MEDICINE

## 2019-12-19 PROCEDURE — 99999 PR PBB SHADOW E&M-EST. PATIENT-LVL III: ICD-10-PCS | Mod: PBBFAC,HCNC,, | Performed by: FAMILY MEDICINE

## 2019-12-19 PROCEDURE — 3074F SYST BP LT 130 MM HG: CPT | Mod: HCNC,CPTII,S$GLB, | Performed by: FAMILY MEDICINE

## 2019-12-19 PROCEDURE — 1126F PR PAIN SEVERITY QUANTIFIED, NO PAIN PRESENT: ICD-10-PCS | Mod: HCNC,S$GLB,, | Performed by: FAMILY MEDICINE

## 2019-12-19 PROCEDURE — 1100F PR PT FALLS ASSESS DOC 2+ FALLS/FALL W/INJURY/YR: ICD-10-PCS | Mod: HCNC,CPTII,S$GLB, | Performed by: FAMILY MEDICINE

## 2019-12-19 PROCEDURE — 36415 COLL VENOUS BLD VENIPUNCTURE: CPT | Mod: HCNC,PO

## 2019-12-19 NOTE — PROGRESS NOTES
"Subjective:       Patient ID: Lulu Eden is a 74 y.o. female.    Chief Complaint: Follow-up (6 month)    Pt is known to me.  Pt is here for followup of chronic medical issues.  The pt is concerned because that her glucoses are running high.  Her last HgA1c was 8.2.  She continues to see the endocrinologist for her thyroid as well as her thyroid surgeon.  The pt states that she can sit down to watch TV and will fall sound asleep.  She snores.  She has CPAP and does not use it because it dries out her mouth and wakes her up.  She has a rough scaly area that has come up on the scar from her right TKA.  The pt says she needs to schedule an appointment with her eye doctor.    Review of Systems   Constitutional: Positive for fatigue (she thinks due to her thyroid). Negative for activity change, appetite change and unexpected weight change.   Eyes: Negative for visual disturbance.   Respiratory: Negative for cough, chest tightness and shortness of breath.    Cardiovascular: Negative for chest pain, palpitations and leg swelling.   Gastrointestinal: Negative for abdominal pain, constipation, diarrhea, nausea and vomiting.   Endocrine: Negative for cold intolerance, heat intolerance and polyuria.   Genitourinary: Negative for decreased urine volume and dysuria.   Musculoskeletal: Negative for arthralgias and back pain.   Skin: Negative for rash.   Neurological: Negative for numbness and headaches.       Objective:       Vitals:    12/19/19 1111   BP: 120/86   BP Location: Left arm   Patient Position: Sitting   BP Method: Medium (Manual)   Pulse: 75   SpO2: 98%   Weight: 73.8 kg (162 lb 11.2 oz)   Height: 5' 4" (1.626 m)     Physical Exam   Constitutional: She is oriented to person, place, and time. She appears well-developed and well-nourished.   HENT:   Head: Normocephalic.   Eyes: Pupils are equal, round, and reactive to light. Conjunctivae and EOM are normal.   Neck: Normal range of motion. Neck supple. No " thyromegaly present.   Cardiovascular: Normal rate, regular rhythm and normal heart sounds.   Pulses:       Dorsalis pedis pulses are 2+ on the right side, and 2+ on the left side.        Posterior tibial pulses are 2+ on the right side, and 2+ on the left side.   Pulmonary/Chest: Effort normal and breath sounds normal.   Abdominal: Soft. Bowel sounds are normal. There is no tenderness.   Musculoskeletal: Normal range of motion. She exhibits no tenderness or deformity.        Right foot: There is normal range of motion and no deformity.        Left foot: There is normal range of motion and no deformity.   Feet:   Right Foot:   Protective Sensation: 7 sites tested. 7 sites sensed.   Skin Integrity: Negative for ulcer.   Left Foot:   Protective Sensation: 7 sites tested. 7 sites sensed.   Skin Integrity: Negative for ulcer.   Lymphadenopathy:     She has no cervical adenopathy.   Neurological: She is alert and oriented to person, place, and time. She displays normal reflexes. No cranial nerve deficit. She exhibits normal muscle tone. Coordination normal.   Skin: Skin is warm and dry.   Psychiatric: She has a normal mood and affect. Her behavior is normal.       Assessment:       1. Type 2 diabetes mellitus with hyperlipidemia    2. Postoperative hypothyroidism    3. Hypertension associated with diabetes    4. Obstructive sleep apnea        Plan:       Lulu was seen today for follow-up.    Diagnoses and all orders for this visit:    Type 2 diabetes mellitus with hyperlipidemia  -     Hemoglobin A1c; Future  -     Microalbumin/creatinine urine ratio; Future    Postoperative hypothyroidism    Hypertension associated with diabetes  -     Lipid panel; Future  -     Comprehensive metabolic panel; Future    Obstructive sleep apnea  -     Ambulatory referral to Sleep Disorders      During this visit, I reviewed the pt's history, medications, allergies, and problem list.

## 2019-12-24 ENCOUNTER — PATIENT MESSAGE (OUTPATIENT)
Dept: FAMILY MEDICINE | Facility: CLINIC | Age: 74
End: 2019-12-24

## 2019-12-24 DIAGNOSIS — E11.65 UNCONTROLLED TYPE 2 DIABETES MELLITUS WITH HYPERGLYCEMIA: Primary | ICD-10-CM

## 2019-12-24 NOTE — PROGRESS NOTES
Let pt know her HgA1c is up to 9.8.  I have put in a referral to Katlyn Wang to help her get controlled.

## 2020-01-01 DIAGNOSIS — E11.69 TYPE 2 DIABETES MELLITUS WITH HYPERLIPIDEMIA: ICD-10-CM

## 2020-01-01 DIAGNOSIS — E78.5 TYPE 2 DIABETES MELLITUS WITH HYPERLIPIDEMIA: ICD-10-CM

## 2020-01-04 DIAGNOSIS — E11.69 TYPE 2 DIABETES MELLITUS WITH HYPERLIPIDEMIA: ICD-10-CM

## 2020-01-04 DIAGNOSIS — E78.5 TYPE 2 DIABETES MELLITUS WITH HYPERLIPIDEMIA: ICD-10-CM

## 2020-01-04 RX ORDER — SITAGLIPTIN AND METFORMIN HYDROCHLORIDE 1000; 50 MG/1; MG/1
TABLET, FILM COATED ORAL
Qty: 180 TABLET | Refills: 1 | Status: CANCELLED | OUTPATIENT
Start: 2020-01-04

## 2020-01-05 DIAGNOSIS — I15.2 HYPERTENSION ASSOCIATED WITH DIABETES: Primary | ICD-10-CM

## 2020-01-05 DIAGNOSIS — E11.59 HYPERTENSION ASSOCIATED WITH DIABETES: Primary | ICD-10-CM

## 2020-01-06 ENCOUNTER — TELEPHONE (OUTPATIENT)
Dept: FAMILY MEDICINE | Facility: CLINIC | Age: 75
End: 2020-01-06

## 2020-01-06 DIAGNOSIS — E11.69 TYPE 2 DIABETES MELLITUS WITH HYPERLIPIDEMIA: ICD-10-CM

## 2020-01-06 DIAGNOSIS — E78.5 TYPE 2 DIABETES MELLITUS WITH HYPERLIPIDEMIA: ICD-10-CM

## 2020-01-06 RX ORDER — SITAGLIPTIN AND METFORMIN HYDROCHLORIDE 1000; 50 MG/1; MG/1
TABLET, FILM COATED ORAL
Qty: 180 TABLET | Refills: 1 | Status: SHIPPED | OUTPATIENT
Start: 2020-01-06 | End: 2020-04-01 | Stop reason: SDUPTHER

## 2020-01-06 RX ORDER — SITAGLIPTIN AND METFORMIN HYDROCHLORIDE 1000; 50 MG/1; MG/1
TABLET, FILM COATED ORAL
Qty: 180 TABLET | Refills: 0 | Status: SHIPPED | OUTPATIENT
Start: 2020-01-06 | End: 2020-04-01

## 2020-01-06 RX ORDER — GLIMEPIRIDE 4 MG/1
TABLET ORAL
Qty: 90 TABLET | Refills: 0 | Status: SHIPPED | OUTPATIENT
Start: 2020-01-06 | End: 2020-04-27 | Stop reason: SDUPTHER

## 2020-01-06 RX ORDER — GLIMEPIRIDE 4 MG/1
TABLET ORAL
Refills: 0 | OUTPATIENT
Start: 2020-01-06

## 2020-01-06 RX ORDER — ATENOLOL 50 MG/1
TABLET ORAL
Qty: 90 TABLET | Refills: 3 | Status: SHIPPED | OUTPATIENT
Start: 2020-01-06 | End: 2021-01-21 | Stop reason: SDUPTHER

## 2020-01-06 NOTE — TELEPHONE ENCOUNTER
----- Message from Jacque Humera sent at 1/3/2020 10:59 AM CST -----  Contact: pt  Type: Needs Medical Advice    Who Called:      Best Call Back Number:     Additional Information: Requesting a call back from Nurse, regarding pt and pharmacy has been requesting a refill for Rx SITagliptan-metformin (JANUMET) 50-1,000 mg per tablet 180 tablet and no has responded pt is out and need refill ASAP ,please call with advice

## 2020-01-06 NOTE — PROGRESS NOTES
Refill Authorization Note     is requesting a refill authorization.    Brief assessment and rationale for refill: QUICK DC: Handled in another encounter                                         Comments:

## 2020-01-06 NOTE — PROGRESS NOTES
Refill Authorization Note     is requesting a refill authorization.    Brief assessment and rationale for refill: APPROVE: prr          Medication Therapy Plan: approve 3 more on each    Medication reconciliation completed: No                         Comments:   Requested Prescriptions   Pending Prescriptions Disp Refills    JANUMET 50-1,000 mg per tablet [Pharmacy Med Name: JANUMET 50/1000MG TABLETS] 180 tablet 0     Sig: TAKE 1 TABLET BY MOUTH TWICE DAILY WITH MEALS       Endocrinology:  Diabetes - Biguanide + DPP-4 Inhibitor Combos Failed - 1/6/2020  8:57 AM        Failed - HBA1C is 7.9 or below and within 180 days     Hemoglobin A1C   Date Value Ref Range Status   12/19/2019 9.8 (H) 4.0 - 5.6 % Final     Comment:     ADA Screening Guidelines:  5.7-6.4%  Consistent with prediabetes  >or=6.5%  Consistent with diabetes  High levels of fetal hemoglobin interfere with the HbA1C  assay. Heterozygous hemoglobin variants (HbS, HgC, etc)do  not significantly interfere with this assay.   However, presence of multiple variants may affect accuracy.     06/14/2019 8.2 (H) 4.0 - 5.6 % Final     Comment:     ADA Screening Guidelines:  5.7-6.4%  Consistent with prediabetes  >or=6.5%  Consistent with diabetes  High levels of fetal hemoglobin interfere with the HbA1C  assay. Heterozygous hemoglobin variants (HbS, HgC, etc)do  not significantly interfere with this assay.   However, presence of multiple variants may affect accuracy.     11/12/2018 7.4 (H) 4.0 - 5.6 % Final     Comment:     ADA Screening Guidelines:  5.7-6.4%  Consistent with prediabetes  >or=6.5%  Consistent with diabetes  High levels of fetal hemoglobin interfere with the HbA1C  assay. Heterozygous hemoglobin variants (HbS, HgC, etc)do  not significantly interfere with this assay.   However, presence of multiple variants may affect accuracy.                Passed - Patient is at least 18 years old        Passed - Office visit in past 12 months or future  90 days     Recent Outpatient Visits            2 weeks ago Type 2 diabetes mellitus with hyperlipidemia    [facility] ZOFIA Roman MD    4 months ago Encounter for preventive health examination    [facility] Simin Valencia NP    6 months ago Type 2 diabetes mellitus with hyperlipidemia    [facility]    1 year ago Type 2 diabetes mellitus with hyperlipidemia    [facility]    1 year ago Type 2 diabetes mellitus with hyperlipidemia    [facility]          Future Appointments              In 5 months ZOFIA Roman MD [facility] [place]                Passed - Cr is 1.4 or below and within 360 days     Creatinine   Date Value Ref Range Status   12/19/2019 0.7 0.5 - 1.4 mg/dL Final   11/12/2018 0.7 0.5 - 1.4 mg/dL Final   03/20/2017 0.7 0.5 - 1.4 mg/dL Final              Passed - eGFR is 30 or above and within 360 days     eGFR if non    Date Value Ref Range Status   12/19/2019 >60.0 >60 mL/min/1.73 m^2 Final     Comment:     Calculation used to obtain the estimated glomerular filtration  rate (eGFR) is the CKD-EPI equation.      11/12/2018 >60.0 >60 mL/min/1.73 m^2 Final     Comment:     Calculation used to obtain the estimated glomerular filtration  rate (eGFR) is the CKD-EPI equation.      03/20/2017 >60.0 >60 mL/min/1.73 m^2 Final     Comment:     Calculation used to obtain the estimated glomerular filtration  rate (eGFR) is the CKD-EPI equation. Since race is unknown   in our information system, the eGFR values for   -American and Non--American patients are given   for each creatinine result.       eGFR if    Date Value Ref Range Status   12/19/2019 >60.0 >60 mL/min/1.73 m^2 Final   11/12/2018 >60.0 >60 mL/min/1.73 m^2 Final   03/20/2017 >60.0 >60 mL/min/1.73 m^2 Final             glimepiride  (AMARYL) 4 MG tablet 90 tablet 0     Sig: TAKE 1 TABLET BY MOUTH IN THE MORNING WITH BREAKFAST       Endocrinology:  Diabetes - Sulfonylureas Failed - 1/6/2020  8:57 AM        Failed - HBA1C is 7.9 or below and within 180 days     Hemoglobin A1C   Date Value Ref Range Status   12/19/2019 9.8 (H) 4.0 - 5.6 % Final     Comment:     ADA Screening Guidelines:  5.7-6.4%  Consistent with prediabetes  >or=6.5%  Consistent with diabetes  High levels of fetal hemoglobin interfere with the HbA1C  assay. Heterozygous hemoglobin variants (HbS, HgC, etc)do  not significantly interfere with this assay.   However, presence of multiple variants may affect accuracy.     06/14/2019 8.2 (H) 4.0 - 5.6 % Final     Comment:     ADA Screening Guidelines:  5.7-6.4%  Consistent with prediabetes  >or=6.5%  Consistent with diabetes  High levels of fetal hemoglobin interfere with the HbA1C  assay. Heterozygous hemoglobin variants (HbS, HgC, etc)do  not significantly interfere with this assay.   However, presence of multiple variants may affect accuracy.     11/12/2018 7.4 (H) 4.0 - 5.6 % Final     Comment:     ADA Screening Guidelines:  5.7-6.4%  Consistent with prediabetes  >or=6.5%  Consistent with diabetes  High levels of fetal hemoglobin interfere with the HbA1C  assay. Heterozygous hemoglobin variants (HbS, HgC, etc)do  not significantly interfere with this assay.   However, presence of multiple variants may affect accuracy.                Passed - Patient is at least 18 years old        Passed - Office visit in past 12 months or future 90 days     Recent Outpatient Visits            2 weeks ago Type 2 [facility] mellitus with [facility]    4 months ago Encounter for preventive health examination    [facility] Simin Valencia NP    6 months ago Type 2 diabetes mellitus with hyperlipidemia    [facility]    1 year ago Type 2  diabetes mellitus with hyperlipidemia    [facility] ZOFIA Roman MD    1 year ago Type 2 diabetes mellitus with hyperlipidemia    [facility]          Future Appointments              In 5 months ZOFIA Roman MD [facility]                Passed - Cr is 1.4 or below and within 360 days     Creatinine   Date Value Ref Range Status   12/19/2019 0.7 0.5 - 1.4 mg/dL Final   11/12/2018 0.7 0.5 - 1.4 mg/dL Final   03/20/2017 0.7 0.5 - 1.4 mg/dL Final              Passed - eGFR is 30 or above and within 360 days     eGFR if non    Date Value Ref Range Status   12/19/2019 >60.0 >60 mL/min/1.73 m^2 Final     Comment:     Calculation used to obtain the estimated glomerular filtration  rate (eGFR) is the CKD-EPI equation.      11/12/2018 >60.0 >60 mL/min/1.73 m^2 Final     Comment:     Calculation used to obtain the estimated glomerular filtration  rate (eGFR) is the CKD-EPI equation.      03/20/2017 >60.0 >60 mL/min/1.73 m^2 Final     Comment:     Calculation used to obtain the estimated glomerular filtration  rate (eGFR) is the CKD-EPI equation. Since race is unknown   in our information system, the eGFR values for   -American and Non--American patients are given   for each creatinine result.       eGFR if    Date Value Ref Range Status   12/19/2019 >60.0 >60 mL/min/1.73 m^2 Final   11/12/2018 >60.0 >60 mL/min/1.73 m^2 Final   03/20/2017 >60.0 >60 mL/min/1.73 m^2 Final              Appointments  past 12m or future 3m with PCP    Date Provider   Last Visit   12/19/2019 ZOFIA Roman MD   Next Visit   6/25/2020 ZOFIA Roman MD

## 2020-01-06 NOTE — TELEPHONE ENCOUNTER
----- Message from Kellie Gilbert sent at 1/4/2020  9:24 AM CST -----  Contact: Lulu chilel  Type:  RX Refill Request    Who Called:  Lulu  Refill or New Rx:  refill  RX Name and Strength:  SITagliptan-metformin (JANUMET) 50-1,000 mg per tablet  How is the patient currently taking it? (ex. 1XDay):  As directed  Is this a 30 day or 90 day RX:  30  Preferred Pharmacy with phone number:          [id number] - [place] - [address] AT [facility] [address]  [address] [place] [address]  Phone: [phone] Fax: [phone]      Local or Mail Order:  local  Ordering Provider:  Able Pryor Call Back Number:  [phone]  Additional Information:  Pls call pt regarding her meds

## 2020-01-06 NOTE — TELEPHONE ENCOUNTER
----- Message from Evan Edwards sent at 1/4/2020 10:02 AM CST -----  Type:  RX Refill Request    Who Called:  Abhijit Eden (Spouse)  Refill or New Rx:  Refill  RX Name and Strength:  SITagliptan-metformin (JANUMET) 50-1,000 mg per tablet  How is the patient currently taking it? (ex. 1XDay):  Take 1 tablet by mouth 2 (two) times daily with meals  Is this a 30 day or 90 day RX:  90  Preferred Pharmacy with phone number:      [facility][id number] - [place], [place] - [address] AT [facility] [address]  [address] [place] [address]  Phone: [phone] Fax: [phone]      Local or Mail Order:  Local  Ordering Provider:  Abel Pryor Call Back Number:  [phone] or [phone]  Additional Information:  Caller states that this is the third request

## 2020-01-06 NOTE — PROGRESS NOTES
Refill Authorization Note     is requesting a refill authorization.    Brief assessment and rationale for refill: APPROVE: prr          Medication Therapy Plan: approve 12 more    Medication reconciliation completed: No                         Comments:   Requested Prescriptions   Pending Prescriptions Disp Refills    atenolol (TENORMIN) 50 MG tablet [Pharmacy Med Name: ATENOLOL 50MG TABLETS] 90 tablet 3     Sig: TAKE 1 TABLET(50 MG) BY MOUTH EVERY DAY       Cardiovascular:  Beta Blockers Passed - 1/6/2020  8:58 AM        Passed - Patient is at least 18 years old        Passed - Last BP in normal range within 360 days     BP Readings from Last 3 Encounters:   12/19/19 120/86   08/29/19 122/76   06/14/19 122/82              Passed - Last Heart Rate in normal range within 360 days     Pulse Readings from Last 3 Encounters:   12/19/19 75   08/29/19 72   06/14/19 68             Passed - Office visit in past 12 months or future 90 days     Recent Outpatient Visits            2 weeks ago Type 2 diabetes mellitus with hyperlipidemia    [facility] Vineet Roman MD    4 months ago Encounter for preventive health examination    [facility] Simin Valencia NP    6 months ago Type 2 diabetes mellitus with hyperlipidemia    [facility] Vineet Roman MD    1 year ago Type 2 diabetes mellitus with hyperlipidemia    [facility] Vineet Roman MD    1 year ago Type 2 diabetes mellitus with hyperlipidemia    [facility] Vineet Roman MD          Future Appointments              In 5 months ZOFIA Roman MD [facility]                Appointments  past 12m or future 3m with PCP    Date Provider   Last Visit   12/19/2019 ZOFIA Roman MD   Next Visit   6/25/2020 ZOFIA Roman MD

## 2020-01-06 NOTE — TELEPHONE ENCOUNTER
Marlen Bautista LPN 31 minutes ago (8:24 AM)        ----- Message from Jacque  sent at 1/3/2020 10:59 AM CST -----  Contact: pt  Type: Needs Medical Advice     Who Called:       Best Call Back Number:      Additional Information: Requesting a call back from Nurse, regarding pt and pharmacy has been requesting a refill for Rx SITagliptan-metformin (JANUMET) 50-1,000 mg per tablet 180 tablet and no has responded pt is out and need refill ASAP ,please call with advice

## 2020-02-18 ENCOUNTER — OFFICE VISIT (OUTPATIENT)
Dept: FAMILY MEDICINE | Facility: CLINIC | Age: 75
End: 2020-02-18
Payer: MEDICARE

## 2020-02-18 VITALS
DIASTOLIC BLOOD PRESSURE: 62 MMHG | WEIGHT: 164 LBS | SYSTOLIC BLOOD PRESSURE: 116 MMHG | HEART RATE: 86 BPM | HEIGHT: 64 IN | BODY MASS INDEX: 28 KG/M2 | OXYGEN SATURATION: 97 % | TEMPERATURE: 98 F

## 2020-02-18 DIAGNOSIS — H92.01 RIGHT EAR PAIN: Primary | ICD-10-CM

## 2020-02-18 PROCEDURE — 3074F SYST BP LT 130 MM HG: CPT | Mod: HCNC,CPTII,S$GLB, | Performed by: FAMILY MEDICINE

## 2020-02-18 PROCEDURE — 1126F PR PAIN SEVERITY QUANTIFIED, NO PAIN PRESENT: ICD-10-PCS | Mod: HCNC,S$GLB,, | Performed by: FAMILY MEDICINE

## 2020-02-18 PROCEDURE — 99213 PR OFFICE/OUTPT VISIT, EST, LEVL III, 20-29 MIN: ICD-10-PCS | Mod: HCNC,25,S$GLB, | Performed by: FAMILY MEDICINE

## 2020-02-18 PROCEDURE — 1126F AMNT PAIN NOTED NONE PRSNT: CPT | Mod: HCNC,S$GLB,, | Performed by: FAMILY MEDICINE

## 2020-02-18 PROCEDURE — 3078F DIAST BP <80 MM HG: CPT | Mod: HCNC,CPTII,S$GLB, | Performed by: FAMILY MEDICINE

## 2020-02-18 PROCEDURE — 99999 PR PBB SHADOW E&M-EST. PATIENT-LVL IV: CPT | Mod: PBBFAC,HCNC,, | Performed by: FAMILY MEDICINE

## 2020-02-18 PROCEDURE — 1159F MED LIST DOCD IN RCRD: CPT | Mod: HCNC,S$GLB,, | Performed by: FAMILY MEDICINE

## 2020-02-18 PROCEDURE — 3078F PR MOST RECENT DIASTOLIC BLOOD PRESSURE < 80 MM HG: ICD-10-PCS | Mod: HCNC,CPTII,S$GLB, | Performed by: FAMILY MEDICINE

## 2020-02-18 PROCEDURE — 1101F PR PT FALLS ASSESS DOC 0-1 FALLS W/OUT INJ PAST YR: ICD-10-PCS | Mod: HCNC,CPTII,S$GLB, | Performed by: FAMILY MEDICINE

## 2020-02-18 PROCEDURE — 1159F PR MEDICATION LIST DOCUMENTED IN MEDICAL RECORD: ICD-10-PCS | Mod: HCNC,S$GLB,, | Performed by: FAMILY MEDICINE

## 2020-02-18 PROCEDURE — 1101F PT FALLS ASSESS-DOCD LE1/YR: CPT | Mod: HCNC,CPTII,S$GLB, | Performed by: FAMILY MEDICINE

## 2020-02-18 PROCEDURE — 3074F PR MOST RECENT SYSTOLIC BLOOD PRESSURE < 130 MM HG: ICD-10-PCS | Mod: HCNC,CPTII,S$GLB, | Performed by: FAMILY MEDICINE

## 2020-02-18 PROCEDURE — 99213 OFFICE O/P EST LOW 20 MIN: CPT | Mod: HCNC,25,S$GLB, | Performed by: FAMILY MEDICINE

## 2020-02-18 PROCEDURE — 99999 PR PBB SHADOW E&M-EST. PATIENT-LVL IV: ICD-10-PCS | Mod: PBBFAC,HCNC,, | Performed by: FAMILY MEDICINE

## 2020-02-18 NOTE — PATIENT INSTRUCTIONS
Warm compress  - take a rag, wet it squeeze out excess water.  - microwave for 10-30 seconds. It should be warm but still comfortable to touch and not burning.   - hold on effective area for several minutes or until warmth escapes. Do 2-3 times a day.

## 2020-02-18 NOTE — PROGRESS NOTES
Subjective:       Patient ID: Lulu Eden is a 74 y.o. female.    Chief Complaint: bump in ear (right)    Comes in today on same-day appointment with complaints of pain in the right ear over the tagus.  States that pain just started that day.  She has not felt any warmth or discharge from the area.  She is suspicious of a boil.  She has never had a boil or abscess that area before.  She has attempted putting a menthol base sab on the area which she says does relieve with some.  She has used the same sab on things which she thinks were boils in the past.  Denies any trauma to the area.  No associated hearing loss or tinnitus.    Review of Systems   Constitutional: Negative for activity change, chills and fever.   HENT: Negative for congestion, sinus pressure and sinus pain.    Eyes: Negative for itching.   Respiratory: Negative for chest tightness and shortness of breath.    Cardiovascular: Negative for chest pain and palpitations.   Gastrointestinal: Negative for abdominal pain, constipation, nausea and vomiting.   Endocrine: Negative for cold intolerance.   Genitourinary: Negative for difficulty urinating and menstrual problem.   Musculoskeletal: Negative for arthralgias, joint swelling and myalgias.   Skin: Negative for rash.   Allergic/Immunologic: Negative for environmental allergies.   Neurological: Negative for dizziness, weakness and headaches.   Psychiatric/Behavioral: Negative for agitation and confusion.       Objective:      Physical Exam   Constitutional: She is oriented to person, place, and time. She appears well-developed and well-nourished.   HENT:   Head: Normocephalic and atraumatic.   Right Ear: External ear normal. There is tenderness. No drainage or swelling.   Left Ear: Hearing and external ear normal.   Ears:    Eyes: Pupils are equal, round, and reactive to light. EOM are normal.   Neck: Normal range of motion. Neck supple.   Cardiovascular: Normal rate and regular rhythm.   No murmur  heard.  Pulmonary/Chest: Effort normal and breath sounds normal. No respiratory distress. She has no wheezes. She has no rales.   Abdominal: Soft. She exhibits no distension. There is no tenderness. There is no guarding.   Musculoskeletal: Normal range of motion.   Neurological: She is alert and oriented to person, place, and time. She displays normal reflexes.   Skin: Skin is warm and dry.   Psychiatric: She has a normal mood and affect. Her behavior is normal. Judgment and thought content normal.   Nursing note and vitals reviewed.      Assessment:       1. Right ear pain        Plan:       -no obvious abnormality of ear. Possible subcutaneous comedone. Abscess or boil unlikely.   - recommended use of warm compresses and observation. Return if condition worsens.

## 2020-03-12 ENCOUNTER — TELEPHONE (OUTPATIENT)
Dept: FAMILY MEDICINE | Facility: CLINIC | Age: 75
End: 2020-03-12

## 2020-03-12 NOTE — TELEPHONE ENCOUNTER
"Ms. Eden declined to schedule her AWV appt when offered. Pt states ," I have to think about it because I don't know what is up".  "

## 2020-03-29 DIAGNOSIS — E11.69 TYPE 2 DIABETES MELLITUS WITH HYPERLIPIDEMIA: ICD-10-CM

## 2020-03-29 DIAGNOSIS — E78.5 TYPE 2 DIABETES MELLITUS WITH HYPERLIPIDEMIA: ICD-10-CM

## 2020-04-01 RX ORDER — SITAGLIPTIN AND METFORMIN HYDROCHLORIDE 1000; 50 MG/1; MG/1
TABLET, FILM COATED ORAL
Qty: 180 TABLET | Refills: 0 | Status: SHIPPED | OUTPATIENT
Start: 2020-04-01 | End: 2020-07-07

## 2020-04-01 NOTE — PROGRESS NOTES
Refill Authorization Note     is requesting a refill authorization.    Brief assessment and rationale for refill: REVIEW: abnormal labs          Medication Therapy Plan: FOVS(6/20); A1C-elevated at last 2 readings(8.2 to 9.8), review    Medication reconciliation completed: No   Pharmacist Review Requested: Yes                     Comments:   Refill Center Care Gap Closure protocols temporarily suspended.   Requested Prescriptions   Pending Prescriptions Disp Refills    JANUMET 50-1,000 mg per tablet [Pharmacy Med Name: JANUMET 50/1000MG TABLETS] 180 tablet 0     Sig: TAKE 1 TABLET BY MOUTH TWICE DAILY WITH MEALS       Endocrinology:  Diabetes - Biguanide + DPP-4 Inhibitor Combos Failed - 4/1/2020 12:50 PM        Failed - HBA1C is 7.9 or below and within 180 days     Hemoglobin A1C   Date Value Ref Range Status   12/19/2019 9.8 (H) 4.0 - 5.6 % Final     Comment:     ADA Screening Guidelines:  5.7-6.4%  Consistent with prediabetes  >or=6.5%  Consistent with diabetes  High levels of fetal hemoglobin interfere with the HbA1C  assay. Heterozygous hemoglobin variants (HbS, HgC, etc)do  not significantly interfere with this assay.   However, presence of multiple variants may affect accuracy.     06/14/2019 8.2 (H) 4.0 - 5.6 % Final     Comment:     ADA Screening Guidelines:  5.7-6.4%  Consistent with prediabetes  >or=6.5%  Consistent with diabetes  High levels of fetal hemoglobin interfere with the HbA1C  assay. Heterozygous hemoglobin variants (HbS, HgC, etc)do  not significantly interfere with this assay.   However, presence of multiple variants may affect accuracy.     11/12/2018 7.4 (H) 4.0 - 5.6 % Final     Comment:     ADA Screening Guidelines:  5.7-6.4%  Consistent with prediabetes  >or=6.5%  Consistent with diabetes  High levels of fetal hemoglobin interfere with the HbA1C  assay. Heterozygous hemoglobin variants (HbS, HgC, etc)do  not significantly interfere with this assay.   However, presence of  multiple variants may affect accuracy.                Passed - Patient is at least 18 years old        Passed - Office visit in past 12 months or future 90 days.     Recent Outpatient Visits            1 month ago Right ear pain    [facility] Master Miller MD    3 months ago Type 2 diabetes mellitus with hyperlipidemia    [facility] ZOFIA Roman MD    7 months ago Encounter for preventive health examination    [facility] Simin Valencia NP    9 months ago Type 2 diabetes mellitus with hyperlipidemia    [facility] ZOFIA Roman MD    1 year ago Type 2 diabetes mellitus with hyperlipidemia    [facility] ZOFIA Roman MD          Future Appointments              In 2 months ZOFIA Roman MD [facility]                Passed - Cr is 1.3 or below and within 360 days     Creatinine   Date Value Ref Range Status   12/19/2019 0.7 0.5 - 1.4 mg/dL Final   11/12/2018 0.7 0.5 - 1.4 mg/dL Final   03/20/2017 0.7 0.5 - 1.4 mg/dL Final              Passed - eGFR is 30 or above and within 360 days     eGFR if non    Date Value Ref Range Status   12/19/2019 >60.0 >60 mL/min/1.73 m^2 Final     Comment:     Calculation used to obtain the estimated glomerular filtration  rate (eGFR) is the CKD-EPI equation.      11/12/2018 >60.0 >60 mL/min/1.73 m^2 Final     Comment:     Calculation used to obtain the estimated glomerular filtration  rate (eGFR) is the CKD-EPI equation.      03/20/2017 >60.0 >60 mL/min/1.73 m^2 Final     Comment:     Calculation used to obtain the estimated glomerular filtration  rate (eGFR) is the CKD-EPI equation. Since race is unknown   in our information system, the eGFR values for   -American and Non--American patients are given   for each creatinine result.       eGFR if    Date Value Ref Range Status   12/19/2019 >60.0 >60 mL/min/1.73 m^2 Final    11/12/2018 >60.0 >60 mL/min/1.73 m^2 Final   03/20/2017 >60.0 >60 mL/min/1.73 m^2 Final               Appointments  past 12m or future 3m with PCP    Date Provider   Last Visit   12/19/2019 ZOFIA Roman MD   Next Visit   6/25/2020 ZOFIA Roman MD   .  ED visits in past 90 days: 0       Note composed:12:57 PM 04/01/2020

## 2020-04-21 DIAGNOSIS — E78.5 TYPE 2 DIABETES MELLITUS WITH HYPERLIPIDEMIA: ICD-10-CM

## 2020-04-21 DIAGNOSIS — E11.69 TYPE 2 DIABETES MELLITUS WITH HYPERLIPIDEMIA: ICD-10-CM

## 2020-04-23 ENCOUNTER — TELEPHONE (OUTPATIENT)
Dept: FAMILY MEDICINE | Facility: CLINIC | Age: 75
End: 2020-04-23

## 2020-04-23 NOTE — TELEPHONE ENCOUNTER
----- Message from Maritza Arredondo sent at 4/23/2020 10:51 AM CDT -----  Contact: patient  Type: Needs Medical Advice  Who Called: patient  Pharmacy name and phone #:  [facility]  Best Call Back Number: [phone] (home) .  Additional Information: the patient needs the Rx for   glimepiride (AMARYL) 4 MG tablet please send to pharm today patient said the pharm has sent 3 requests

## 2020-04-27 DIAGNOSIS — E78.5 TYPE 2 DIABETES MELLITUS WITH HYPERLIPIDEMIA: ICD-10-CM

## 2020-04-27 DIAGNOSIS — E11.69 TYPE 2 DIABETES MELLITUS WITH HYPERLIPIDEMIA: ICD-10-CM

## 2020-04-27 RX ORDER — GLIMEPIRIDE 4 MG/1
TABLET ORAL
Qty: 90 TABLET | Refills: 0 | Status: SHIPPED | OUTPATIENT
Start: 2020-04-27 | End: 2020-07-13

## 2020-04-27 RX ORDER — GLIMEPIRIDE 4 MG/1
TABLET ORAL
Qty: 90 TABLET | Refills: 0 | OUTPATIENT
Start: 2020-04-27

## 2020-04-27 NOTE — TELEPHONE ENCOUNTER
----- Message from Felipe Felix sent at 4/27/2020 10:31 AM CDT -----  Contact: tanner Lacey [phone]  or [phone]  Type:  RX Refill Request    Who Called:   Abhijit [phone]  or [phone]    Refill or New Rx:  Refill    RX Name and Strength:  glimepiride (AMARYL) 4 MG tablet 90 tablet 0 1/6/2020    Sig: TAKE 1 TABLET BY MOUTH IN THE MORNING WITH BREAKFAST   Sent to pharmacy as: glimepiride (AMARYL) 4 MG tablet       How is the patient currently taking it? (ex. 1XDay):  See above.    Is this a 30 day or 90 day RX:  90 day    Preferred Pharmacy with phone number:      [id number] - [place], [place]  [address] [facility] [address]  [address] [address]  Phone: [phone] Fax: [phone]    Local or Mail Order:  Local     Ordering Provider:  Suki Roman     Best Call Back Number:  tanner Lacey [phone]  or [phone]    Additional Information:      Advised this is the sixth request for this medication. Advised ptnt has been out of this medication for the last ten days. Please send to pharmacy as soon as possible today.

## 2020-04-28 NOTE — PROGRESS NOTES
Quick DC. Duplicate Request   Refill Authorization Note    is requesting a refill authorization.    Brief assessment and rationale for refill: QUICK DC: duplicate               Medication reconciliation completed: No                          Comments:   Pended Medication(s)   Requested Prescriptions     Pending Prescriptions Disp Refills    glimepiride (AMARYL) 4 MG tablet [Pharmacy Med Name: GLIMEPIRIDE 4MG TABLETS] 90 tablet 0     Sig: TAKE 1 TABLET BY MOUTH IN THE MORNING WITH BREAKFAST        Duplicate Pended Encounter(s)/ Last Prescribed Details:    Ordering Provider: -- AGNES #:  -- NPI:  --    Authorizing Provider: ZOFIA Roman MD AGNES #:  [id number] NPI:  [id number]    Ordering User:  ZOFIA Roman MD            Diagnosis Association: Type 2 diabetes mellitus with hyperlipidemia (E11.69 , E78.5)      Original Order:  glimepiride (AMARYL) 4 MG tablet [[id number]]      Pharmacy:  [facility] [address] [place] [address] AGNES #:  [id number]     Pharmacy Comments:  --          Fill quantity remaining:  -- Fill quantity used:  -- Next fill due: --       Outpatient Medication Detail      Disp Refills Start End    glimepiride (AMARYL) 4 MG tablet 90 tablet 0 4/27/2020     Sig: TAKE 1 TABLET BY MOUTH IN THE MORNING WITH BREAKFAST    Sent to pharmacy as: glimepiride (AMARYL) 4 MG tablet    E-Prescribing Status: Receipt confirmed by pharmacy (4/27/2020  4:43 PM CDT)         Note composed:7:14 PM 04/27/2020

## 2020-05-05 ENCOUNTER — PATIENT MESSAGE (OUTPATIENT)
Dept: ADMINISTRATIVE | Facility: HOSPITAL | Age: 75
End: 2020-05-05

## 2020-06-25 ENCOUNTER — OFFICE VISIT (OUTPATIENT)
Dept: FAMILY MEDICINE | Facility: CLINIC | Age: 75
End: 2020-06-25
Payer: MEDICARE

## 2020-06-25 ENCOUNTER — HOSPITAL ENCOUNTER (OUTPATIENT)
Dept: RADIOLOGY | Facility: HOSPITAL | Age: 75
Discharge: HOME | End: 2020-06-25
Attending: FAMILY MEDICINE
Payer: MEDICARE

## 2020-06-25 VITALS
SYSTOLIC BLOOD PRESSURE: 140 MMHG | OXYGEN SATURATION: 97 % | TEMPERATURE: 98 F | BODY MASS INDEX: 27.89 KG/M2 | HEIGHT: 64 IN | HEART RATE: 81 BPM | WEIGHT: 163.38 LBS | DIASTOLIC BLOOD PRESSURE: 78 MMHG

## 2020-06-25 DIAGNOSIS — R07.89 CHEST WALL PAIN: Primary | ICD-10-CM

## 2020-06-25 DIAGNOSIS — R07.89 CHEST WALL PAIN: ICD-10-CM

## 2020-06-25 DIAGNOSIS — E11.59 HYPERTENSION ASSOCIATED WITH DIABETES: ICD-10-CM

## 2020-06-25 DIAGNOSIS — E78.5 TYPE 2 DIABETES MELLITUS WITH HYPERLIPIDEMIA: ICD-10-CM

## 2020-06-25 DIAGNOSIS — I15.2 HYPERTENSION ASSOCIATED WITH DIABETES: ICD-10-CM

## 2020-06-25 DIAGNOSIS — R93.0 SKULL/HEAD X-RAY ABNORMALITY: ICD-10-CM

## 2020-06-25 DIAGNOSIS — E11.69 TYPE 2 DIABETES MELLITUS WITH HYPERLIPIDEMIA: ICD-10-CM

## 2020-06-25 PROCEDURE — 99999 PR PBB SHADOW E&M-EST. PATIENT-LVL IV: CPT | Mod: PBBFAC,HCNC,, | Performed by: FAMILY MEDICINE

## 2020-06-25 PROCEDURE — 71100 XR RIBS 2 VIEW LEFT: ICD-10-PCS | Mod: 26,HCNC,LT, | Performed by: RADIOLOGY

## 2020-06-25 PROCEDURE — 99999 PR PBB SHADOW E&M-EST. PATIENT-LVL IV: ICD-10-PCS | Mod: PBBFAC,HCNC,, | Performed by: FAMILY MEDICINE

## 2020-06-25 PROCEDURE — 70250 X-RAY EXAM OF SKULL: CPT | Mod: TC,HCNC,FY,PO

## 2020-06-25 PROCEDURE — 99214 OFFICE O/P EST MOD 30 MIN: CPT | Mod: HCNC,S$GLB,, | Performed by: FAMILY MEDICINE

## 2020-06-25 PROCEDURE — 70250 X-RAY EXAM OF SKULL: CPT | Mod: 26,HCNC,, | Performed by: RADIOLOGY

## 2020-06-25 PROCEDURE — 1125F PR PAIN SEVERITY QUANTIFIED, PAIN PRESENT: ICD-10-PCS | Mod: HCNC,S$GLB,, | Performed by: FAMILY MEDICINE

## 2020-06-25 PROCEDURE — 3080F DIAST BP >= 90 MM HG: CPT | Mod: HCNC,CPTII,S$GLB, | Performed by: FAMILY MEDICINE

## 2020-06-25 PROCEDURE — 99214 PR OFFICE/OUTPT VISIT, EST, LEVL IV, 30-39 MIN: ICD-10-PCS | Mod: HCNC,S$GLB,, | Performed by: FAMILY MEDICINE

## 2020-06-25 PROCEDURE — 71100 X-RAY EXAM RIBS UNI 2 VIEWS: CPT | Mod: 26,HCNC,LT, | Performed by: RADIOLOGY

## 2020-06-25 PROCEDURE — 1101F PR PT FALLS ASSESS DOC 0-1 FALLS W/OUT INJ PAST YR: ICD-10-PCS | Mod: HCNC,CPTII,S$GLB, | Performed by: FAMILY MEDICINE

## 2020-06-25 PROCEDURE — 99499 RISK ADDL DX/OHS AUDIT: ICD-10-PCS | Mod: S$GLB,,, | Performed by: FAMILY MEDICINE

## 2020-06-25 PROCEDURE — 3077F PR MOST RECENT SYSTOLIC BLOOD PRESSURE >= 140 MM HG: ICD-10-PCS | Mod: HCNC,CPTII,S$GLB, | Performed by: FAMILY MEDICINE

## 2020-06-25 PROCEDURE — 70250 XR SKULL LTD LESS THAN 4 VIEWS: ICD-10-PCS | Mod: 26,HCNC,, | Performed by: RADIOLOGY

## 2020-06-25 PROCEDURE — 3080F PR MOST RECENT DIASTOLIC BLOOD PRESSURE >= 90 MM HG: ICD-10-PCS | Mod: HCNC,CPTII,S$GLB, | Performed by: FAMILY MEDICINE

## 2020-06-25 PROCEDURE — 99499 UNLISTED E&M SERVICE: CPT | Mod: S$GLB,,, | Performed by: FAMILY MEDICINE

## 2020-06-25 PROCEDURE — 71100 X-RAY EXAM RIBS UNI 2 VIEWS: CPT | Mod: TC,HCNC,FY,PO,LT

## 2020-06-25 PROCEDURE — 1101F PT FALLS ASSESS-DOCD LE1/YR: CPT | Mod: HCNC,CPTII,S$GLB, | Performed by: FAMILY MEDICINE

## 2020-06-25 PROCEDURE — 1159F PR MEDICATION LIST DOCUMENTED IN MEDICAL RECORD: ICD-10-PCS | Mod: HCNC,S$GLB,, | Performed by: FAMILY MEDICINE

## 2020-06-25 PROCEDURE — 1159F MED LIST DOCD IN RCRD: CPT | Mod: HCNC,S$GLB,, | Performed by: FAMILY MEDICINE

## 2020-06-25 PROCEDURE — 3077F SYST BP >= 140 MM HG: CPT | Mod: HCNC,CPTII,S$GLB, | Performed by: FAMILY MEDICINE

## 2020-06-25 PROCEDURE — 1125F AMNT PAIN NOTED PAIN PRSNT: CPT | Mod: HCNC,S$GLB,, | Performed by: FAMILY MEDICINE

## 2020-06-25 RX ORDER — NAPROXEN 500 MG/1
500 TABLET ORAL 2 TIMES DAILY WITH MEALS
Qty: 20 TABLET | Refills: 1 | Status: SHIPPED | OUTPATIENT
Start: 2020-06-25 | End: 2020-07-05

## 2020-06-25 NOTE — PROGRESS NOTES
"Subjective:       Patient ID: Lulu Eden is a 75 y.o. female.    Chief Complaint: Fall (causing chest pain when she landed on her chest)    Pt is known to me.  The pt fell over her dog about 2 weeks ago.  She landed on her chest in the grass.  She had the dog's leash around her left wrist and she felt it jerk her arm.  She is still having pain in her left chest wall.  It is worse after sleeping on her left side, with movement of her left arm, with deep breaths.  She gets some relief with Tylenol.  She had no bruises.  She has no numbness or tingling in her fingers.  She is also waking up with a dry mouth.  She mouth breaths at night.  She also reports that she went to an urgent care in 2018.  She had an xray of her sinuses.  The doctor there told her she had an usual spot of the skull.  Her last HbA1c was 9.8 6 months ago.  She admits to a poor diet for several months.  She sees an endocrinologist for her thyroid.  She still sees the surgeon who did her head and neck surgery.    Review of Systems   Constitutional: Negative for activity change, appetite change, fatigue and unexpected weight change.   Eyes: Negative for visual disturbance.   Respiratory: Negative for cough, chest tightness and shortness of breath.    Cardiovascular: Positive for chest pain (chest wall). Negative for palpitations and leg swelling.   Gastrointestinal: Negative for abdominal pain, constipation, diarrhea, nausea and vomiting.   Endocrine: Negative for cold intolerance, heat intolerance and polyuria.   Genitourinary: Negative for decreased urine volume and dysuria.   Musculoskeletal: Negative for arthralgias and back pain.   Skin: Negative for rash.   Neurological: Negative for numbness and headaches.       Objective:       Vitals:    06/25/20 1103   BP: (!) 162/98   Pulse: 81   Temp: 98.2 °F (36.8 °C)   TempSrc: Oral   SpO2: 97%   Weight: 74.1 kg (163 lb 5.8 oz)   Height: 5' 4" (1.626 m)     Physical Exam  Constitutional:       " Appearance: She is well-developed.   HENT:      Head: Normocephalic.   Eyes:      Conjunctiva/sclera: Conjunctivae normal.      Pupils: Pupils are equal, round, and reactive to light.   Neck:      Musculoskeletal: Normal range of motion and neck supple.      Thyroid: No thyromegaly.   Cardiovascular:      Rate and Rhythm: Normal rate and regular rhythm.      Pulses:           Dorsalis pedis pulses are 2+ on the right side and 2+ on the left side.        Posterior tibial pulses are 2+ on the right side and 2+ on the left side.      Heart sounds: Normal heart sounds.   Pulmonary:      Effort: Pulmonary effort is normal.      Breath sounds: Normal breath sounds.   Abdominal:      General: Bowel sounds are normal.      Palpations: Abdomen is soft.      Tenderness: There is no abdominal tenderness.   Musculoskeletal: Normal range of motion.         General: Tenderness (over left sternal costal junction and lower anterior ribs) present. No deformity.      Right foot: Normal range of motion. No deformity.      Left foot: Normal range of motion. No deformity.   Feet:      Right foot:      Protective Sensation: 7 sites tested. 7 sites sensed.      Skin integrity: No ulcer.      Left foot:      Protective Sensation: 7 sites tested. 7 sites sensed.      Skin integrity: No ulcer.   Lymphadenopathy:      Cervical: No cervical adenopathy.   Skin:     General: Skin is warm and dry.   Neurological:      Mental Status: She is alert and oriented to person, place, and time.      Cranial Nerves: No cranial nerve deficit.      Motor: No abnormal muscle tone.      Coordination: Coordination normal.      Deep Tendon Reflexes: Reflexes normal.   Psychiatric:         Behavior: Behavior normal.         Assessment:       1. Chest wall pain    2. Hypertension associated with diabetes    3. Type 2 diabetes mellitus with hyperlipidemia    4. Skull/head x-ray abnormality        Plan:       Lulu was seen today for fall.    Diagnoses and all  orders for this visit:    Chest wall pain  -     X-Ray Ribs 2 View Left; Future  -     naproxen (NAPROSYN) 500 MG tablet; Take 1 tablet (500 mg total) by mouth 2 (two) times daily with meals. for 10 days    Hypertension associated with diabetes    Type 2 diabetes mellitus with hyperlipidemia  -     Hemoglobin A1C; Future    Skull/head x-ray abnormality  -     X-Ray Skull Ltd Less Than 4 Views; Future      During this visit, I reviewed the pt's history, medications, allergies, and problem list.

## 2020-06-26 ENCOUNTER — PATIENT MESSAGE (OUTPATIENT)
Dept: FAMILY MEDICINE | Facility: CLINIC | Age: 75
End: 2020-06-26

## 2020-06-26 ENCOUNTER — TELEPHONE (OUTPATIENT)
Dept: FAMILY MEDICINE | Facility: CLINIC | Age: 75
End: 2020-06-26

## 2020-06-26 DIAGNOSIS — E11.65 UNCONTROLLED TYPE 2 DIABETES MELLITUS WITH HYPERGLYCEMIA: Primary | ICD-10-CM

## 2020-06-26 NOTE — TELEPHONE ENCOUNTER
----- Message from Gema German sent at 6/26/2020 11:59 AM CDT -----  Regarding: rtc, results  Contact: [facility] [[id number]]  Patient is returning nurse's phone call, results.  Please call patient back at [phone].

## 2020-07-06 DIAGNOSIS — E78.5 TYPE 2 DIABETES MELLITUS WITH HYPERLIPIDEMIA: ICD-10-CM

## 2020-07-06 DIAGNOSIS — E11.69 TYPE 2 DIABETES MELLITUS WITH HYPERLIPIDEMIA: ICD-10-CM

## 2020-07-07 RX ORDER — SITAGLIPTIN AND METFORMIN HYDROCHLORIDE 1000; 50 MG/1; MG/1
TABLET, FILM COATED ORAL
Qty: 180 TABLET | Refills: 0 | Status: SHIPPED | OUTPATIENT
Start: 2020-07-07 | End: 2020-10-12

## 2020-07-07 NOTE — TELEPHONE ENCOUNTER
Refill Authorization Note    is requesting a refill authorization.    Brief assessment and rationale for refill: APPROVE: prr               Medication reconciliation completed: No                         Comments:      Requested Prescriptions   Signed Prescriptions Disp Refills    JANUMET 50-1,000 mg per tablet 180 tablet 0     Sig: TAKE 1 TABLET BY MOUTH TWICE DAILY WITH MEALS       There is no refill protocol information for this order         Last A1C: 6 months   Lab Results   Component Value Date    HGBA1C 9.8 (H) 06/25/2020    HGBA1C 9.8 (H) 12/19/2019    HGBA1C 8.2 (H) 06/14/2019    No results found for: LABA1C     Last Creatinine: 12 months   Lab Results   Component Value Date    CREATININE 0.7 12/19/2019    CREATININE 0.7 11/12/2018    CREATININE 0.7 03/20/2017         Last  eGFR:  12 months   Lab Results   Component Value Date    EGFRNONAA >60.0 12/19/2019    EGFRNONAA >60.0 11/12/2018    EGFRNONAA >60.0 03/20/2017     Lab Results   Component Value Date    ESTGFRAFRICA >60.0 12/19/2019    ESTGFRAFRICA >60.0 11/12/2018    ESTGFRAFRICA >60.0 03/20/2017        Appointments  past 12m or future 3m with PCP    Date Provider   Last Visit   6/25/2020 ZOFIA Roman MD   Next Visit   12/29/2020 ZOFIA Roman MD   ED visits in past 90 days: [unfilled]     Note composed:2:41 PM 07/07/2020

## 2020-07-12 DIAGNOSIS — E78.5 TYPE 2 DIABETES MELLITUS WITH HYPERLIPIDEMIA: ICD-10-CM

## 2020-07-12 DIAGNOSIS — E11.69 TYPE 2 DIABETES MELLITUS WITH HYPERLIPIDEMIA: ICD-10-CM

## 2020-07-13 RX ORDER — GLIMEPIRIDE 4 MG/1
TABLET ORAL
Qty: 90 TABLET | Refills: 0 | Status: SHIPPED | OUTPATIENT
Start: 2020-07-13 | End: 2020-10-26 | Stop reason: SDUPTHER

## 2020-07-13 NOTE — PROGRESS NOTES
Refill Authorization Note    is requesting a refill authorization.    Brief assessment and rationale for refill: APPROVE: prr          Medication Therapy Plan: A1C >8; approve 3 more    Medication reconciliation completed: No                         Comments:      Requested Prescriptions   Signed Prescriptions Disp Refills    glimepiride (AMARYL) 4 MG tablet 90 tablet 0     Sig: TAKE 1 TABLET BY MOUTH IN THE MORNING WITH BREAKFAST       Endocrinology:  Diabetes - Sulfonylureas Failed - 7/12/2020  8:33 AM        Failed - HBA1C is 7.9 or below and within 180 days     Hemoglobin A1C   Date Value Ref Range Status   06/25/2020 9.8 (H) 4.0 - 5.6 % Final     Comment:     ADA Screening Guidelines:  5.7-6.4%  Consistent with prediabetes  >or=6.5%  Consistent with diabetes  High levels of fetal hemoglobin interfere with the HbA1C  assay. Heterozygous hemoglobin variants (HbS, HgC, etc)do  not significantly interfere with this assay.   However, presence of multiple variants may affect accuracy.     12/19/2019 9.8 (H) 4.0 - 5.6 % Final     Comment:     ADA Screening Guidelines:  5.7-6.4%  Consistent with prediabetes  >or=6.5%  Consistent with diabetes  High levels of fetal hemoglobin interfere with the HbA1C  assay. Heterozygous hemoglobin variants (HbS, HgC, etc)do  not significantly interfere with this assay.   However, presence of multiple variants may affect accuracy.     06/14/2019 8.2 (H) 4.0 - 5.6 % Final     Comment:     ADA Screening Guidelines:  5.7-6.4%  Consistent with prediabetes  >or=6.5%  Consistent with diabetes  High levels of fetal hemoglobin interfere with the HbA1C  assay. Heterozygous hemoglobin variants (HbS, HgC, etc)do  not significantly interfere with this assay.   However, presence of multiple variants may affect accuracy.                Passed - Patient is at least 18 years old        Passed - Office visit in past 12 months or future 90 days.     Recent Outpatient Visits            2 weeks  ago Chest wall pain    [facility] ZOFIA Roman MD    4 months ago Right ear pain    [facility] Master Miller MD    6 months ago Type 2 diabetes mellitus with hyperlipidemia    [facility] Vineet Roman MD    10 months ago Encounter for preventive health examination    [facility] Simin Valencia, TC    1 year ago Type 2 diabetes mellitus with hyperlipidemia    [facility]          Future Appointments              In 2 days [facility] [place]    In 5 months ZOFIA Roman MD [facility]                Passed - Cr is 1.3 or below and within 360 days     Creatinine   Date Value Ref Range Status   12/19/2019 0.7 0.5 - 1.4 mg/dL Final   11/12/2018 0.7 0.5 - 1.4 mg/dL Final   03/20/2017 0.7 0.5 - 1.4 mg/dL Final              Passed - eGFR is 30 or above and within 360 days     eGFR if non    Date Value Ref Range Status   12/19/2019 >60.0 >60 mL/min/1.73 m^2 Final     Comment:     Calculation used to obtain the estimated glomerular filtration  rate (eGFR) is the CKD-EPI equation.      11/12/2018 >60.0 >60 mL/min/1.73 m^2 Final     Comment:     Calculation used to obtain the estimated glomerular filtration  rate (eGFR) is the CKD-EPI equation.      03/20/2017 >60.0 >60 mL/min/1.73 m^2 Final     Comment:     Calculation used to obtain the estimated glomerular filtration  rate (eGFR) is the CKD-EPI equation. Since race is unknown   in our information system, the eGFR values for   -American and Non--American patients are given   for each creatinine result.       eGFR if    Date Value Ref Range Status   12/19/2019 >60.0 >60 mL/min/1.73 m^2 Final   11/12/2018 >60.0 >60 mL/min/1.73 m^2 Final   03/20/2017 >60.0 >60 mL/min/1.73 m^2 Final                  Appointments  past 12m or future 3m with PCP    Date Provider   Last  Visit   6/25/2020 ZOFIA Roman MD   Next Visit   12/29/2020 ZOFIA Roman MD   ED visits in past 90 days: 0     Note composed:12:47 PM 07/13/2020

## 2020-07-15 ENCOUNTER — HOSPITAL ENCOUNTER (OUTPATIENT)
Dept: RADIOLOGY | Facility: CLINIC | Age: 75
Discharge: HOME | End: 2020-07-15
Attending: FAMILY MEDICINE
Payer: MEDICARE

## 2020-07-15 DIAGNOSIS — Z12.31 ENCOUNTER FOR SCREENING MAMMOGRAM FOR MALIGNANT NEOPLASM OF BREAST: ICD-10-CM

## 2020-07-15 PROCEDURE — 77063 BREAST TOMOSYNTHESIS BI: CPT | Mod: 26,HCNC,, | Performed by: RADIOLOGY

## 2020-07-15 PROCEDURE — 77063 MAMMO DIGITAL SCREENING BILAT WITH TOMOSYNTHESIS_CAD: ICD-10-PCS | Mod: 26,HCNC,, | Performed by: RADIOLOGY

## 2020-07-15 PROCEDURE — 77067 SCR MAMMO BI INCL CAD: CPT | Mod: TC,HCNC,PO

## 2020-07-15 PROCEDURE — 77067 SCR MAMMO BI INCL CAD: CPT | Mod: 26,HCNC,, | Performed by: RADIOLOGY

## 2020-07-15 PROCEDURE — 77067 MAMMO DIGITAL SCREENING BILAT WITH TOMOSYNTHESIS_CAD: ICD-10-PCS | Mod: 26,HCNC,, | Performed by: RADIOLOGY

## 2020-09-29 ENCOUNTER — PATIENT MESSAGE (OUTPATIENT)
Dept: OTHER | Facility: OTHER | Age: 75
End: 2020-09-29

## 2020-10-11 DIAGNOSIS — E11.59 HYPERTENSION ASSOCIATED WITH DIABETES: Primary | ICD-10-CM

## 2020-10-11 DIAGNOSIS — E78.5 TYPE 2 DIABETES MELLITUS WITH HYPERLIPIDEMIA: ICD-10-CM

## 2020-10-11 DIAGNOSIS — E11.69 TYPE 2 DIABETES MELLITUS WITH HYPERLIPIDEMIA: ICD-10-CM

## 2020-10-11 DIAGNOSIS — I15.2 HYPERTENSION ASSOCIATED WITH DIABETES: Primary | ICD-10-CM

## 2020-10-11 NOTE — TELEPHONE ENCOUNTER
Care Due:                  Date            Visit Type   Department     Provider  --------------------------------------------------------------------------------                                ESTABLISHED                              PATIENT      [facility]  Last Visit: 06-      SHORT        RD PORRAS                              ESTABLISHED   [facility]  Next Visit: 12-      PATIENT      MEDICINE       ZOFIA PORRAS                                                            Last  Test          Frequency    Reason                     Performed    Due Date  --------------------------------------------------------------------------------    Cr..........  12 months..  kiana DIETZe.....  12- 12-    HBA1C.......  6 months...  manasa DIETZpiride.....  06- 12-    Powered by [employer]. Reference number: [id number]. 10/11/2020 8:23:57 AM CDT

## 2020-10-12 RX ORDER — SITAGLIPTIN AND METFORMIN HYDROCHLORIDE 1000; 50 MG/1; MG/1
TABLET, FILM COATED ORAL
Qty: 180 TABLET | Refills: 0 | Status: SHIPPED | OUTPATIENT
Start: 2020-10-12 | End: 2021-01-12 | Stop reason: SDUPTHER

## 2020-10-12 NOTE — PROGRESS NOTES
Refill Authorization Note   Lulu Eden is requesting a refill authorization.  Brief assessment and rationale for refill: Approve    -Medication-related problems identified: Requires labs  Medication Therapy Plan: CDMR. (CMP/LIPID/A1C)    Medication reconciliation completed: No   Comments:   Orders Placed This Encounter    Comprehensive Metabolic Panel    Hemoglobin A1C    Lipid Panel    JANUMET 50-1,000 mg per tablet      Requested Prescriptions   Signed Prescriptions Disp Refills    JANUMET 50-1,000 mg per tablet 180 tablet 0     Sig: TAKE 1 TABLET BY MOUTH TWICE DAILY WITH MEALS       Endocrinology:  Diabetes - Biguanide + DPP-4 Inhibitor Combos Failed - 10/11/2020  8:23 AM        Failed - HBA1C is 7.9 or below and within 180 days     Hemoglobin A1C   Date Value Ref Range Status   06/25/2020 9.8 (H) 4.0 - 5.6 % Final     Comment:     ADA Screening Guidelines:  5.7-6.4%  Consistent with prediabetes  >or=6.5%  Consistent with diabetes  High levels of fetal hemoglobin interfere with the HbA1C  assay. Heterozygous hemoglobin variants (HbS, HgC, etc)do  not significantly interfere with this assay.   However, presence of multiple variants may affect accuracy.     12/19/2019 9.8 (H) 4.0 - 5.6 % Final     Comment:     ADA Screening Guidelines:  5.7-6.4%  Consistent with prediabetes  >or=6.5%  Consistent with diabetes  High levels of fetal hemoglobin interfere with the HbA1C  assay. Heterozygous hemoglobin variants (HbS, HgC, etc)do  not significantly interfere with this assay.   However, presence of multiple variants may affect accuracy.     06/14/2019 8.2 (H) 4.0 - 5.6 % Final     Comment:     ADA Screening Guidelines:  5.7-6.4%  Consistent with prediabetes  >or=6.5%  Consistent with diabetes  High levels of fetal hemoglobin interfere with the HbA1C  assay. Heterozygous hemoglobin variants (HbS, HgC, etc)do  not significantly interfere with this assay.   However, presence of multiple variants may affect  accuracy.                Passed - Patient is at least 18 years old        Passed - Office Visit within last 12 months or future 90 days.     Recent Outpatient Visits            3 months ago Chest wall pain    [facility] ZOFIA Roman MD    7 months ago Right ear pain    [facility] Master Miller MD    9 months ago Type 2 diabetes mellitus with hyperlipidemia    [facility]    1 year ago Encounter for preventive health examination    [facility] Simin Valencia NP    1 year ago Type 2 diabetes mellitus with hyperlipidemia    [facility] ZOFIA Roman MD          Future Appointments              In 2 months ZOFIA Roman MD [facility] [place]                Passed - Cr is 1.3 or below and within 360 days     Creatinine   Date Value Ref Range Status   12/19/2019 0.7 0.5 - 1.4 mg/dL Final   11/12/2018 0.7 0.5 - 1.4 mg/dL Final   03/20/2017 0.7 0.5 - 1.4 mg/dL Final              Passed - eGFR is 30 or above and within 360 days     eGFR if non    Date Value Ref Range Status   12/19/2019 >60.0 >60 mL/min/1.73 m^2 Final     Comment:     Calculation used to obtain the estimated glomerular filtration  rate (eGFR) is the CKD-EPI equation.      11/12/2018 >60.0 >60 mL/min/1.73 m^2 Final     Comment:     Calculation used to obtain the estimated glomerular filtration  rate (eGFR) is the CKD-EPI equation.      03/20/2017 >60.0 >60 mL/min/1.73 m^2 Final     Comment:     Calculation used to obtain the estimated glomerular filtration  rate (eGFR) is the CKD-EPI equation. Since race is unknown   in our information system, the eGFR values for   -American and Non--American patients are given   for each creatinine result.       eGFR if    Date Value Ref Range Status   12/19/2019 >60.0 >60 mL/min/1.73 m^2 Final   11/12/2018 >60.0 >60 mL/min/1.73 m^2 Final    03/20/2017 >60.0 >60 mL/min/1.73 m^2 Final                  Appointments  past 12m or future 3m with PCP    Date Provider   Last Visit   6/25/2020 ZOFIA Roman MD   Next Visit   12/29/2020 ZOFIA Roman MD   ED visits in past 90 days: 0     Note composed:11:44 AM 10/12/2020

## 2020-10-12 NOTE — PROGRESS NOTES
Provider Staff:     Action is required for this patient.     Please schedule patient for Labs (CMP/LIPID/A1C)    Thanks!  [facility]     Appointments  past 12m or future 3m with PCP    Date Provider   Last Visit   6/25/2020 ZOFIA Roman MD   Next Visit   12/29/2020 ZOFIA Roman MD     Note composed:11:47 AM 10/12/2020

## 2020-10-26 DIAGNOSIS — E11.69 TYPE 2 DIABETES MELLITUS WITH HYPERLIPIDEMIA: ICD-10-CM

## 2020-10-26 DIAGNOSIS — E78.5 TYPE 2 DIABETES MELLITUS WITH HYPERLIPIDEMIA: ICD-10-CM

## 2020-10-26 NOTE — TELEPHONE ENCOUNTER
No new care gaps identified.  Powered by [employer]. Reference number: [id number]. 10/26/2020 2:28:45 PM   CDT

## 2020-10-28 RX ORDER — GLIMEPIRIDE 4 MG/1
TABLET ORAL
Qty: 90 TABLET | Refills: 0 | Status: SHIPPED | OUTPATIENT
Start: 2020-10-28 | End: 2020-11-02 | Stop reason: SDUPTHER

## 2020-10-28 NOTE — TELEPHONE ENCOUNTER
Provider Staff:     Action is required for this patient.     Please schedule patient for Labs (CMP, LIPIDS, A1C)    Thanks!  [facility]     Appointments  past 12m or future 3m with PCP    Date Provider   Last Visit   6/25/2020 ZOFIA Roman MD   Next Visit   12/29/2020 ZOFIA Roman MD     Note composed:11:51 AM 10/28/2020

## 2020-10-28 NOTE — PROGRESS NOTES
Refill Authorization Note   Lulu Eden is requesting a refill authorization.  Brief assessment and rationale for refill: Approve    -Medication-related problems identified: Requires labs  Medication Therapy Plan: CDMR. (FOVS). LABS(A1C, CMP, LIPIDS) PER WOG    Medication reconciliation completed: No   Comments:       Requested Prescriptions   Pending Prescriptions Disp Refills    glimepiride (AMARYL) 4 MG tablet 90 tablet 0     Sig: Please delete all prior scripts with same name and strength including on holds.       Endocrinology:  Diabetes - Sulfonylureas Failed - 10/28/2020 11:39 AM        Failed - HBA1C is 8 or below and within 180 days     Hemoglobin A1C   Date Value Ref Range Status   06/25/2020 9.8 (H) 4.0 - 5.6 % Final     Comment:     ADA Screening Guidelines:  5.7-6.4%  Consistent with prediabetes  >or=6.5%  Consistent with diabetes  High levels of fetal hemoglobin interfere with the HbA1C  assay. Heterozygous hemoglobin variants (HbS, HgC, etc)do  not significantly interfere with this assay.   However, presence of multiple variants may affect accuracy.     12/19/2019 9.8 (H) 4.0 - 5.6 % Final     Comment:     ADA Screening Guidelines:  5.7-6.4%  Consistent with prediabetes  >or=6.5%  Consistent with diabetes  High levels of fetal hemoglobin interfere with the HbA1C  assay. Heterozygous hemoglobin variants (HbS, HgC, etc)do  not significantly interfere with this assay.   However, presence of multiple variants may affect accuracy.     06/14/2019 8.2 (H) 4.0 - 5.6 % Final     Comment:     ADA Screening Guidelines:  5.7-6.4%  Consistent with prediabetes  >or=6.5%  Consistent with diabetes  High levels of fetal hemoglobin interfere with the HbA1C  assay. Heterozygous hemoglobin variants (HbS, HgC, etc)do  not significantly interfere with this assay.   However, presence of multiple variants may affect accuracy.                Passed - Patient is at least 18 years old        Passed - Office visit in past  12 months or future 90 days     Recent Outpatient Visits            4 months ago Chest wall pain    [facility] ZOFIA Roman MD    8 months ago Right ear pain    [facility] Master Miller MD    10 months ago Type 2 diabetes mellitus with hyperlipidemia    [facility] MD    1 year ago Encounter for preventive health examination    [facility] Simin Valencia NP    1 year ago Type 2 diabetes mellitus with hyperlipidemia    [facility]          Future Appointments              In 2 months ZOFIA Roman MD [facility] [place]                Passed - Cr is 1.4 or below and within 360 days     Creatinine   Date Value Ref Range Status   12/19/2019 0.7 0.5 - 1.4 mg/dL Final   11/12/2018 0.7 0.5 - 1.4 mg/dL Final   03/20/2017 0.7 0.5 - 1.4 mg/dL Final              Passed - eGFR is 30 or above and within 360 days     eGFR if non    Date Value Ref Range Status   12/19/2019 >60.0 >60 mL/min/1.73 m^2 Final     Comment:     Calculation used to obtain the estimated glomerular filtration  rate (eGFR) is the CKD-EPI equation.      11/12/2018 >60.0 >60 mL/min/1.73 m^2 Final     Comment:     Calculation used to obtain the estimated glomerular filtration  rate (eGFR) is the CKD-EPI equation.      03/20/2017 >60.0 >60 mL/min/1.73 m^2 Final     Comment:     Calculation used to obtain the estimated glomerular filtration  rate (eGFR) is the CKD-EPI equation. Since race is unknown   in our information system, the eGFR values for   -American and Non--American patients are given   for each creatinine result.       eGFR if    Date Value Ref Range Status   12/19/2019 >60.0 >60 mL/min/1.73 m^2 Final   11/12/2018 >60.0 >60 mL/min/1.73 m^2 Final   03/20/2017 >60.0 >60 mL/min/1.73 m^2 Final                  Appointments  past 12m or future 3m with PCP    Date  Provider   Last Visit   6/25/2020 ZOFIA Roman MD   Next Visit   12/29/2020 ZOFIA Roman MD   ED visits in past 90 days: 0     Note composed:11:47 AM 10/28/2020

## 2020-10-30 DIAGNOSIS — E11.69 TYPE 2 DIABETES MELLITUS WITH HYPERLIPIDEMIA: ICD-10-CM

## 2020-10-30 DIAGNOSIS — E78.5 TYPE 2 DIABETES MELLITUS WITH HYPERLIPIDEMIA: ICD-10-CM

## 2020-10-30 NOTE — TELEPHONE ENCOUNTER
No new care gaps identified.  Powered by [employer]. Reference number: [id number]. 10/30/2020 11:22:19 AM   CARISSAT

## 2020-10-31 RX ORDER — GLIMEPIRIDE 4 MG/1
TABLET ORAL
Qty: 90 TABLET | Refills: 0 | OUTPATIENT
Start: 2020-10-31

## 2020-11-01 NOTE — PROGRESS NOTES
Sage STEARNS Duplicate Request    Refill Authorization Note   Lulu Eden is requesting a refill authorization.    Brief assessment and rationale for refill: Quick Discontinue       Medication Therapy Plan: [facility]  Medication reconciliation completed: No    Comments:   Pended Medication(s)       Requested Prescriptions     Refused Prescriptions Disp Refills    glimepiride (AMARYL) 4 MG tablet [Pharmacy Med Name: Glimepiride 4 MG Oral Tablet] 90 tablet 0     Sig: TAKE 1 TABLET BY MOUTH IN THE MORNING WITH BREAKFAST     Refused By: LEISA MOHAN     Reason for Refusal: Request already responded to by other means (e.g. phone or fax)        Duplicate Pended Encounter(s)/ Last Prescribed Details:    Authorizing Provider: ZOFIA Roman MD AGNES #:  [id number] NPI:  [id number]    Ordering User:  Kirti Corbin      Cosigner:  ZOFIA Roman MD Signed:  10/28/2020 15:09           Diagnosis Association: Type 2 diabetes mellitus with hyperlipidemia (E11.69 , E78.5)      Original Order:  glimepiride (AMARYL) 4 MG tablet [[id number]]      Pharmacy:  [id number] [place] [address] [address] AT [facility] [address]   AGNES #:  [id number]   Pharmacy Comments: --          Fill quantity remaining: -- Fill quantity used: -- Next fill due: --       Outpatient Medication Detail     Disp Refills Start End RAFAEL   glimepiride (AMARYL) 4 MG tablet 90 tablet 0 10/28/2020  No   Sig: Please delete all prior scripts with same name and strength including on holds.   Sent to pharmacy as: glimepiride (AMARYL) 4 MG tablet   Class: Normal   Notes to Pharmacy: Please inactivate all prior scripts with same name and strength including any scripts on hold.   Order: [id number]   Cosign for Ordering: Accepted by ZOFIA Roman MD on 10/28/2020  3:09 PM   Date/Time Signed: 10/28/2020 11:51       E-Prescribing Status: Receipt confirmed by pharmacy (10/28/2020 11:52 AM CDT)   Ordering Encounter Report    Associated Reports    View Encounter                Note composed:8:45 PM 10/31/2020

## 2020-11-02 DIAGNOSIS — E78.5 TYPE 2 DIABETES MELLITUS WITH HYPERLIPIDEMIA: ICD-10-CM

## 2020-11-02 DIAGNOSIS — E11.69 TYPE 2 DIABETES MELLITUS WITH HYPERLIPIDEMIA: ICD-10-CM

## 2020-11-02 RX ORDER — GLIMEPIRIDE 4 MG/1
4 TABLET ORAL
Qty: 90 TABLET | Refills: 0 | Status: SHIPPED | OUTPATIENT
Start: 2020-11-02 | End: 2020-11-02 | Stop reason: SDUPTHER

## 2020-11-02 RX ORDER — GLIMEPIRIDE 4 MG/1
4 TABLET ORAL
Qty: 90 TABLET | Refills: 0 | Status: SHIPPED | OUTPATIENT
Start: 2020-11-02 | End: 2021-01-21 | Stop reason: SDUPTHER

## 2020-11-02 NOTE — PROGRESS NOTES
Pharmacy Call Documentation    Pharmacy Called:  Mike Call Time: 13:09   Spoke with: Rao ([address] location) 11/02/2020      Called to verify that the script that was sent on: 10/28/20 for 90   day supply was received by the pharmacy.     Script was not received. Will pend to be sent again to above pharmacy    Request will be: pended for review     Additional actions required: no     Current Medication Requested: (refill encounter)   Requested Prescriptions     Pending Prescriptions Disp Refills    glimepiride (AMARYL) 4 MG tablet 90 tablet 0     Sig: Take 1 tablet (4 mg total) by mouth daily with breakfast. Please delete all prior scripts with same name and strength including on holds.      [facility]     Note composed: 11/02/2020 1:09 PM

## 2020-11-02 NOTE — PROGRESS NOTES
Refill Authorization Note   Lulu Eden is requesting a refill authorization.  Brief assessment and rationale for refill: Approve     Medication Therapy Plan: CDMR. Resend    Medication reconciliation completed: Yes   Comments:   Orders Placed This Encounter    glimepiride (AMARYL) 4 MG tablet      Requested Prescriptions   Signed Prescriptions Disp Refills    glimepiride (AMARYL) 4 MG tablet 90 tablet 0     Sig: Take 1 tablet (4 mg total) by mouth daily with breakfast. Please delete all prior scripts with same name and strength including on holds.       Endocrinology:  Diabetes - Sulfonylureas Failed - 11/2/2020  1:04 PM        Failed - HBA1C is 8 or below and within 180 days     Hemoglobin A1C   Date Value Ref Range Status   06/25/2020 9.8 (H) 4.0 - 5.6 % Final     Comment:     ADA Screening Guidelines:  5.7-6.4%  Consistent with prediabetes  >or=6.5%  Consistent with diabetes  High levels of fetal hemoglobin interfere with the HbA1C  assay. Heterozygous hemoglobin variants (HbS, HgC, etc)do  not significantly interfere with this assay.   However, presence of multiple variants may affect accuracy.     12/19/2019 9.8 (H) 4.0 - 5.6 % Final     Comment:     ADA Screening Guidelines:  5.7-6.4%  Consistent with prediabetes  >or=6.5%  Consistent with diabetes  High levels of fetal hemoglobin interfere with the HbA1C  assay. Heterozygous hemoglobin variants (HbS, HgC, etc)do  not significantly interfere with this assay.   However, presence of multiple variants may affect accuracy.     06/14/2019 8.2 (H) 4.0 - 5.6 % Final     Comment:     ADA Screening Guidelines:  5.7-6.4%  Consistent with prediabetes  >or=6.5%  Consistent with diabetes  High levels of fetal hemoglobin interfere with the HbA1C  assay. Heterozygous hemoglobin variants (HbS, HgC, etc)do  not significantly interfere with this assay.   However, presence of multiple variants may affect accuracy.                Passed - Patient is at least 18 years old         Passed - Office visit in past 12 months or future 90 days     Recent Outpatient Visits            4 months ago [facility] wall pain    [facility]    8 months ago Right ear pain    [facility] Master Miller MD    10 months ago Type 2 diabetes mellitus with hyperlipidemia    [facility]    1 year ago Encounter for preventive health examination    [facility] Simin Valencia NP    1 year ago Type 2 diabetes mellitus with hyperlipidemia    [facility]          Future Appointments              In 1 month ZOFIA Roman MD [facility] [place]                Passed - Cr is 1.4 or below and within 360 days     Creatinine   Date Value Ref Range Status   12/19/2019 0.7 0.5 - 1.4 mg/dL Final   11/12/2018 0.7 0.5 - 1.4 mg/dL Final   03/20/2017 0.7 0.5 - 1.4 mg/dL Final              Passed - eGFR is 30 or above and within 360 days     eGFR if non    Date Value Ref Range Status   12/19/2019 >60.0 >60 mL/min/1.73 m^2 Final     Comment:     Calculation used to obtain the estimated glomerular filtration  rate (eGFR) is the CKD-EPI equation.      11/12/2018 >60.0 >60 mL/min/1.73 m^2 Final     Comment:     Calculation used to obtain the estimated glomerular filtration  rate (eGFR) is the CKD-EPI equation.      03/20/2017 >60.0 >60 mL/min/1.73 m^2 Final     Comment:     Calculation used to obtain the estimated glomerular filtration  rate (eGFR) is the CKD-EPI equation. Since race is unknown   in our information system, the eGFR values for   -American and Non--American patients are given   for each creatinine result.       eGFR if    Date Value Ref Range Status   12/19/2019 >60.0 >60 mL/min/1.73 m^2 Final   11/12/2018 >60.0 >60 mL/min/1.73 m^2 Final   03/20/2017 >60.0 >60 mL/min/1.73 m^2 Final                glimepiride (AMARYL) 4 MG  tablet 90 tablet 0     Sig: Take 1 tablet (4 mg total) by mouth daily with breakfast. Please delete all prior scripts with same name and strength including on holds.       There is no refill protocol information for this order         Appointments  past 12m or future 3m with PCP    Date Provider   Last Visit   6/25/2020 ZOFIA Roman MD   Next Visit   12/29/2020 ZOFIA Roman MD   ED visits in past 90 days: 0     Note composed:1:15 PM 11/02/2020

## 2020-11-02 NOTE — TELEPHONE ENCOUNTER
----- Message from Eva Casas sent at 11/2/2020 12:42 PM CST -----  Regarding: refill  Contact: pt  Type:  RX Refill Request    Who Called:  pt  Refill or New Rx:  refill   RX Name and Strength:  #glimepiride (AMARYL) 4 MG tablet  How is the patient currently taking it? (ex. 1XDay):    Is this a 30 day or 90 day RX:  90  Preferred Pharmacy with phone number:   [id number] [place] [address] AT [facility] [address];  Local or Mail Order:  local  Ordering Provider:  Dr Roman  [facility] Call Back Number:  [phone]  Additional Information:  Patient just got off the phone  with this pharmacy and the med has not   Been called into them at this time.   A request is being sent to your office.

## 2020-11-02 NOTE — PROGRESS NOTES
Refill Authorization Note   Lulu Eden is requesting a refill authorization.  Brief assessment and rationale for refill: Approve     Medication Therapy Plan: CDMR. Resend    Medication reconciliation completed: Yes   Comments:   Orders Placed This Encounter    glimepiride (AMARYL) 4 MG tablet      Requested Prescriptions   Signed Prescriptions Disp Refills    glimepiride (AMARYL) 4 MG tablet 90 tablet 0     Sig: Take 1 tablet (4 mg total) by mouth daily with breakfast. Please delete all prior scripts with same name and strength including on holds.       Endocrinology:  Diabetes - Sulfonylureas Failed - 11/2/2020  1:04 PM        Failed - HBA1C is 8 or below and within 180 days     Hemoglobin A1C   Date Value Ref Range Status   06/25/2020 9.8 (H) 4.0 - 5.6 % Final     Comment:     ADA Screening Guidelines:  5.7-6.4%  Consistent with prediabetes  >or=6.5%  Consistent with diabetes  High levels of fetal hemoglobin interfere with the HbA1C  assay. Heterozygous hemoglobin variants (HbS, HgC, etc)do  not significantly interfere with this assay.   However, presence of multiple variants may affect accuracy.     12/19/2019 9.8 (H) 4.0 - 5.6 % Final     Comment:     ADA Screening Guidelines:  5.7-6.4%  Consistent with prediabetes  >or=6.5%  Consistent with diabetes  High levels of fetal hemoglobin interfere with the HbA1C  assay. Heterozygous hemoglobin variants (HbS, HgC, etc)do  not significantly interfere with this assay.   However, presence of multiple variants may affect accuracy.     06/14/2019 8.2 (H) 4.0 - 5.6 % Final     Comment:     ADA Screening Guidelines:  5.7-6.4%  Consistent with prediabetes  >or=6.5%  Consistent with diabetes  High levels of fetal hemoglobin interfere with the HbA1C  assay. Heterozygous hemoglobin variants (HbS, HgC, etc)do  not significantly interfere with this assay.   However, presence of multiple variants may affect accuracy.                Passed - Patient is at least 18 years old         Passed - Office visit in past 12 months or future 90 days     Recent Outpatient Visits            4 months ago [facility] wall pain    [facility] MD    8 months ago Right ear pain    [facility] Master Miller MD    10 months ago Type 2 diabetes mellitus with hyperlipidemia    [facility]    1 year ago Encounter for preventive health examination    [facility] Simin Valencia NP    1 year ago Type 2 diabetes mellitus with hyperlipidemia    [facility] MD          Future Appointments              In 1 month ZOFIA Roman MD [facility] [place]                Passed - Cr is 1.4 or below and within 360 days     Creatinine   Date Value Ref Range Status   12/19/2019 0.7 0.5 - 1.4 mg/dL Final   11/12/2018 0.7 0.5 - 1.4 mg/dL Final   03/20/2017 0.7 0.5 - 1.4 mg/dL Final              Passed - eGFR is 30 or above and within 360 days     eGFR if non    Date Value Ref Range Status   12/19/2019 >60.0 >60 mL/min/1.73 m^2 Final     Comment:     Calculation used to obtain the estimated glomerular filtration  rate (eGFR) is the CKD-EPI equation.      11/12/2018 >60.0 >60 mL/min/1.73 m^2 Final     Comment:     Calculation used to obtain the estimated glomerular filtration  rate (eGFR) is the CKD-EPI equation.      03/20/2017 >60.0 >60 mL/min/1.73 m^2 Final     Comment:     Calculation used to obtain the estimated glomerular filtration  rate (eGFR) is the CKD-EPI equation. Since race is unknown   in our information system, the eGFR values for   -American and Non--American patients are given   for each creatinine result.       eGFR if    Date Value Ref Range Status   12/19/2019 >60.0 >60 mL/min/1.73 m^2 Final   11/12/2018 >60.0 >60 mL/min/1.73 m^2 Final   03/20/2017 >60.0 >60 mL/min/1.73 m^2 Final                glimepiride (AMARYL) 4 MG  tablet 90 tablet 0     Sig: Take 1 tablet (4 mg total) by mouth daily with breakfast. Please delete all prior scripts with same name and strength including on holds.       There is no refill protocol information for this order         Appointments  past 12m or future 3m with PCP    Date Provider   Last Visit   6/25/2020 ZOFIA Roman MD   Next Visit   12/29/2020 ZOFIA Roman MD   ED visits in past 90 days: 0     Note composed:1:08 PM 11/02/2020

## 2020-11-02 NOTE — TELEPHONE ENCOUNTER
No new care gaps identified.  Powered by [employer]. Reference number: [id number]. 11/02/2020 1:05:02 PM   CST

## 2020-11-13 ENCOUNTER — PATIENT MESSAGE (OUTPATIENT)
Dept: ADMINISTRATIVE | Facility: HOSPITAL | Age: 75
End: 2020-11-13

## 2020-11-16 ENCOUNTER — TELEPHONE (OUTPATIENT)
Dept: FAMILY MEDICINE | Facility: CLINIC | Age: 75
End: 2020-11-16

## 2020-11-16 NOTE — TELEPHONE ENCOUNTER
Outreach to patient for hypertension management.     RE: Need to find out if patient is monitoring blood pressure at home.  If so, the most recent blood pressure is needed to update the chart.    Spoke with patient and she states that she does monitor her blood pressure at home, but she does not keep a log.  She does not remember what her last blood pressure reading was.    Appointment with Dr. Roman 12/29/2020.

## 2020-12-10 ENCOUNTER — PATIENT OUTREACH (OUTPATIENT)
Dept: ADMINISTRATIVE | Facility: HOSPITAL | Age: 75
End: 2020-12-10

## 2020-12-10 NOTE — PROGRESS NOTES
12/10/2020 Care Everywhere updates requested and reviewed.  Immunizations reconciled. Media reports reviewed.  Duplicate HM overrides and  orders removed.  Overdue HM topic chart audit and/or requested.  Overdue lab testing linked to upcoming lab appointments if applies.    Lab suzi, and TapnScrap reviewed.  Requested eye exam  records .     Health Maintenance Due   Topic Date Due    Hepatitis C Screening  1945    Shingles Vaccine (1 of 2) 1993    Influenza Vaccine (1) 2020    Hemoglobin A1c  2020    Urine Microalbumin  2020

## 2020-12-10 NOTE — LETTER
AUTHORIZATION FOR RELEASE OF   CONFIDENTIAL INFORMATION    Dear Maximus Ribera MD,    We are seeing Lulu Eden, date of birth 1945, in the clinic at [facility]. BARNEY Roman MD is the patient's PCP. Lulu Eden has an outstanding lab/procedure at the time we reviewed her chart. In order to help keep her health information updated, she has authorized us to request the following medical record(s):                                              ( X )  EYE EXAM                    Please fax records to [facility], M Kelli Smith, MD, [phone].     If you have any questions, please contact   Marce Garcia, Care Coordinator  [facility]  Phone: [phone]  FAX: [phone]            Patient Name: Lulu Eden  : 1945  Patient Phone #: [phone]

## 2020-12-11 ENCOUNTER — PATIENT MESSAGE (OUTPATIENT)
Dept: OTHER | Facility: OTHER | Age: 75
End: 2020-12-11

## 2020-12-18 ENCOUNTER — PES CALL (OUTPATIENT)
Dept: ADMINISTRATIVE | Facility: CLINIC | Age: 75
End: 2020-12-18

## 2021-01-21 ENCOUNTER — LAB VISIT (OUTPATIENT)
Dept: LAB | Facility: HOSPITAL | Age: 76
End: 2021-01-21
Attending: FAMILY MEDICINE
Payer: MEDICARE

## 2021-01-21 ENCOUNTER — OFFICE VISIT (OUTPATIENT)
Dept: FAMILY MEDICINE | Facility: CLINIC | Age: 76
End: 2021-01-21
Payer: MEDICARE

## 2021-01-21 VITALS
WEIGHT: 162.06 LBS | OXYGEN SATURATION: 97 % | DIASTOLIC BLOOD PRESSURE: 72 MMHG | SYSTOLIC BLOOD PRESSURE: 120 MMHG | HEART RATE: 81 BPM | TEMPERATURE: 98 F | BODY MASS INDEX: 27.81 KG/M2

## 2021-01-21 DIAGNOSIS — I15.2 HYPERTENSION ASSOCIATED WITH DIABETES: ICD-10-CM

## 2021-01-21 DIAGNOSIS — E11.59 HYPERTENSION ASSOCIATED WITH DIABETES: ICD-10-CM

## 2021-01-21 DIAGNOSIS — G47.33 OBSTRUCTIVE SLEEP APNEA: ICD-10-CM

## 2021-01-21 DIAGNOSIS — E55.9 VITAMIN D DEFICIENCY: ICD-10-CM

## 2021-01-21 DIAGNOSIS — E78.5 TYPE 2 DIABETES MELLITUS WITH HYPERLIPIDEMIA: ICD-10-CM

## 2021-01-21 DIAGNOSIS — E21.3 HYPERPARATHYROIDISM: ICD-10-CM

## 2021-01-21 DIAGNOSIS — E11.69 TYPE 2 DIABETES MELLITUS WITH HYPERLIPIDEMIA: ICD-10-CM

## 2021-01-21 DIAGNOSIS — E11.65 UNCONTROLLED TYPE 2 DIABETES MELLITUS WITH HYPERGLYCEMIA: Primary | ICD-10-CM

## 2021-01-21 DIAGNOSIS — E89.0 POSTOPERATIVE HYPOTHYROIDISM: ICD-10-CM

## 2021-01-21 DIAGNOSIS — C73 CANCER OF THYROID: ICD-10-CM

## 2021-01-21 LAB
ALBUMIN SERPL BCP-MCNC: 4.5 G/DL (ref 3.5–5.2)
ALP SERPL-CCNC: 75 U/L (ref 55–135)
ALT SERPL W/O P-5'-P-CCNC: 27 U/L (ref 10–44)
ANION GAP SERPL CALC-SCNC: 14 MMOL/L (ref 8–16)
AST SERPL-CCNC: 17 U/L (ref 10–40)
BILIRUB SERPL-MCNC: 0.6 MG/DL (ref 0.1–1)
BUN SERPL-MCNC: 26 MG/DL (ref 8–23)
CALCIUM SERPL-MCNC: 10.4 MG/DL (ref 8.7–10.5)
CHLORIDE SERPL-SCNC: 99 MMOL/L (ref 95–110)
CHOLEST SERPL-MCNC: 282 MG/DL (ref 120–199)
CHOLEST/HDLC SERPL: 6 {RATIO} (ref 2–5)
CO2 SERPL-SCNC: 25 MMOL/L (ref 23–29)
CREAT SERPL-MCNC: 0.8 MG/DL (ref 0.5–1.4)
EST. GFR  (AFRICAN AMERICAN): >60 ML/MIN/1.73 M^2
EST. GFR  (NON AFRICAN AMERICAN): >60 ML/MIN/1.73 M^2
GLUCOSE SERPL-MCNC: 247 MG/DL (ref 70–110)
HDLC SERPL-MCNC: 47 MG/DL (ref 40–75)
HDLC SERPL: 16.7 % (ref 20–50)
LDLC SERPL CALC-MCNC: ABNORMAL MG/DL (ref 63–159)
NONHDLC SERPL-MCNC: 235 MG/DL
POTASSIUM SERPL-SCNC: 3.9 MMOL/L (ref 3.5–5.1)
PROT SERPL-MCNC: 7.1 G/DL (ref 6–8.4)
SODIUM SERPL-SCNC: 138 MMOL/L (ref 136–145)
TRIGL SERPL-MCNC: 497 MG/DL (ref 30–150)

## 2021-01-21 PROCEDURE — 36415 COLL VENOUS BLD VENIPUNCTURE: CPT | Mod: PO

## 2021-01-21 PROCEDURE — 1126F AMNT PAIN NOTED NONE PRSNT: CPT | Mod: S$GLB,,, | Performed by: FAMILY MEDICINE

## 2021-01-21 PROCEDURE — 80061 LIPID PANEL: CPT

## 2021-01-21 PROCEDURE — 82306 VITAMIN D 25 HYDROXY: CPT

## 2021-01-21 PROCEDURE — 99999 PR PBB SHADOW E&M-EST. PATIENT-LVL III: ICD-10-PCS | Mod: PBBFAC,,, | Performed by: FAMILY MEDICINE

## 2021-01-21 PROCEDURE — 1126F PR PAIN SEVERITY QUANTIFIED, NO PAIN PRESENT: ICD-10-PCS | Mod: S$GLB,,, | Performed by: FAMILY MEDICINE

## 2021-01-21 PROCEDURE — 1101F PT FALLS ASSESS-DOCD LE1/YR: CPT | Mod: CPTII,S$GLB,, | Performed by: FAMILY MEDICINE

## 2021-01-21 PROCEDURE — 1101F PR PT FALLS ASSESS DOC 0-1 FALLS W/OUT INJ PAST YR: ICD-10-PCS | Mod: CPTII,S$GLB,, | Performed by: FAMILY MEDICINE

## 2021-01-21 PROCEDURE — 99499 UNLISTED E&M SERVICE: CPT | Mod: S$GLB,,, | Performed by: FAMILY MEDICINE

## 2021-01-21 PROCEDURE — 1159F PR MEDICATION LIST DOCUMENTED IN MEDICAL RECORD: ICD-10-PCS | Mod: S$GLB,,, | Performed by: FAMILY MEDICINE

## 2021-01-21 PROCEDURE — 80053 COMPREHEN METABOLIC PANEL: CPT

## 2021-01-21 PROCEDURE — 3078F DIAST BP <80 MM HG: CPT | Mod: CPTII,S$GLB,, | Performed by: FAMILY MEDICINE

## 2021-01-21 PROCEDURE — 1159F MED LIST DOCD IN RCRD: CPT | Mod: S$GLB,,, | Performed by: FAMILY MEDICINE

## 2021-01-21 PROCEDURE — 99499 RISK ADDL DX/OHS AUDIT: ICD-10-PCS | Mod: S$GLB,,, | Performed by: FAMILY MEDICINE

## 2021-01-21 PROCEDURE — 99499 RISK ADDL DX/OHS AUDIT: ICD-10-PCS | Mod: ,,, | Performed by: FAMILY MEDICINE

## 2021-01-21 PROCEDURE — 99214 PR OFFICE/OUTPT VISIT, EST, LEVL IV, 30-39 MIN: ICD-10-PCS | Mod: S$GLB,,, | Performed by: FAMILY MEDICINE

## 2021-01-21 PROCEDURE — 3074F SYST BP LT 130 MM HG: CPT | Mod: CPTII,S$GLB,, | Performed by: FAMILY MEDICINE

## 2021-01-21 PROCEDURE — 99214 OFFICE O/P EST MOD 30 MIN: CPT | Mod: S$GLB,,, | Performed by: FAMILY MEDICINE

## 2021-01-21 PROCEDURE — 99499 UNLISTED E&M SERVICE: CPT | Mod: ,,, | Performed by: FAMILY MEDICINE

## 2021-01-21 PROCEDURE — 83036 HEMOGLOBIN GLYCOSYLATED A1C: CPT

## 2021-01-21 PROCEDURE — 3074F PR MOST RECENT SYSTOLIC BLOOD PRESSURE < 130 MM HG: ICD-10-PCS | Mod: CPTII,S$GLB,, | Performed by: FAMILY MEDICINE

## 2021-01-21 PROCEDURE — 3288F FALL RISK ASSESSMENT DOCD: CPT | Mod: CPTII,S$GLB,, | Performed by: FAMILY MEDICINE

## 2021-01-21 PROCEDURE — 99999 PR PBB SHADOW E&M-EST. PATIENT-LVL III: CPT | Mod: PBBFAC,,, | Performed by: FAMILY MEDICINE

## 2021-01-21 PROCEDURE — 3078F PR MOST RECENT DIASTOLIC BLOOD PRESSURE < 80 MM HG: ICD-10-PCS | Mod: CPTII,S$GLB,, | Performed by: FAMILY MEDICINE

## 2021-01-21 PROCEDURE — 3288F PR FALLS RISK ASSESSMENT DOCUMENTED: ICD-10-PCS | Mod: CPTII,S$GLB,, | Performed by: FAMILY MEDICINE

## 2021-01-21 RX ORDER — GLIMEPIRIDE 4 MG/1
4 TABLET ORAL
Qty: 90 TABLET | Refills: 1 | Status: SHIPPED | OUTPATIENT
Start: 2021-01-21 | End: 2021-07-20

## 2021-01-21 RX ORDER — SITAGLIPTIN AND METFORMIN HYDROCHLORIDE 1000; 50 MG/1; MG/1
1 TABLET, FILM COATED ORAL 2 TIMES DAILY WITH MEALS
Qty: 180 TABLET | Refills: 1 | Status: SHIPPED | OUTPATIENT
Start: 2021-01-21 | End: 2021-07-22

## 2021-01-21 RX ORDER — ATENOLOL 50 MG/1
50 TABLET ORAL DAILY
Qty: 90 TABLET | Refills: 3 | Status: SHIPPED | OUTPATIENT
Start: 2021-01-21 | End: 2021-02-15

## 2021-01-22 LAB
25(OH)D3+25(OH)D2 SERPL-MCNC: 98 NG/ML (ref 30–96)
ESTIMATED AVG GLUCOSE: 258 MG/DL (ref 68–131)
HBA1C MFR BLD HPLC: 10.6 % (ref 4–5.6)

## 2021-01-29 ENCOUNTER — PES CALL (OUTPATIENT)
Dept: ADMINISTRATIVE | Facility: CLINIC | Age: 76
End: 2021-01-29

## 2021-02-04 ENCOUNTER — TELEPHONE (OUTPATIENT)
Dept: FAMILY MEDICINE | Facility: CLINIC | Age: 76
End: 2021-02-04

## 2021-02-14 DIAGNOSIS — E11.59 HYPERTENSION ASSOCIATED WITH DIABETES: ICD-10-CM

## 2021-02-14 DIAGNOSIS — I15.2 HYPERTENSION ASSOCIATED WITH DIABETES: ICD-10-CM

## 2021-02-15 RX ORDER — ATENOLOL 50 MG/1
TABLET ORAL
Qty: 90 TABLET | Refills: 3 | Status: SHIPPED | OUTPATIENT
Start: 2021-02-15 | End: 2022-02-21 | Stop reason: SDUPTHER

## 2021-03-08 ENCOUNTER — PES CALL (OUTPATIENT)
Dept: ADMINISTRATIVE | Facility: CLINIC | Age: 76
End: 2021-03-08

## 2021-04-27 ENCOUNTER — PES CALL (OUTPATIENT)
Dept: ADMINISTRATIVE | Facility: CLINIC | Age: 76
End: 2021-04-27

## 2021-06-16 ENCOUNTER — TELEPHONE (OUTPATIENT)
Dept: FAMILY MEDICINE | Facility: CLINIC | Age: 76
End: 2021-06-16

## 2021-06-17 ENCOUNTER — OFFICE VISIT (OUTPATIENT)
Dept: FAMILY MEDICINE | Facility: CLINIC | Age: 76
End: 2021-06-17
Payer: MEDICARE

## 2021-06-17 VITALS
HEIGHT: 64 IN | OXYGEN SATURATION: 97 % | SYSTOLIC BLOOD PRESSURE: 124 MMHG | BODY MASS INDEX: 26.79 KG/M2 | WEIGHT: 156.94 LBS | HEART RATE: 81 BPM | DIASTOLIC BLOOD PRESSURE: 86 MMHG

## 2021-06-17 DIAGNOSIS — I15.2 HYPERTENSION ASSOCIATED WITH DIABETES: ICD-10-CM

## 2021-06-17 DIAGNOSIS — R30.0 DYSURIA: Primary | ICD-10-CM

## 2021-06-17 DIAGNOSIS — E78.5 TYPE 2 DIABETES MELLITUS WITH HYPERLIPIDEMIA: ICD-10-CM

## 2021-06-17 DIAGNOSIS — E11.69 TYPE 2 DIABETES MELLITUS WITH HYPERLIPIDEMIA: ICD-10-CM

## 2021-06-17 DIAGNOSIS — E11.59 HYPERTENSION ASSOCIATED WITH DIABETES: ICD-10-CM

## 2021-06-17 LAB
BILIRUB SERPL-MCNC: NEGATIVE MG/DL
BLOOD URINE, POC: NEGATIVE
CLARITY, POC UA: CLEAR
COLOR, POC UA: YELLOW
GLUCOSE UR QL STRIP: 1000
KETONES UR QL STRIP: ABNORMAL
LEUKOCYTE ESTERASE URINE, POC: NEGATIVE
NITRITE, POC UA: NEGATIVE
PH, POC UA: 5
PROTEIN, POC: NEGATIVE
SPECIFIC GRAVITY, POC UA: 1.02
UROBILINOGEN, POC UA: NORMAL

## 2021-06-17 PROCEDURE — 3288F FALL RISK ASSESSMENT DOCD: CPT | Mod: CPTII,S$GLB,, | Performed by: NURSE PRACTITIONER

## 2021-06-17 PROCEDURE — 87086 URINE CULTURE/COLONY COUNT: CPT | Performed by: NURSE PRACTITIONER

## 2021-06-17 PROCEDURE — 99999 PR PBB SHADOW E&M-EST. PATIENT-LVL IV: CPT | Mod: PBBFAC,,, | Performed by: NURSE PRACTITIONER

## 2021-06-17 PROCEDURE — 99213 OFFICE O/P EST LOW 20 MIN: CPT | Mod: 25,S$GLB,, | Performed by: NURSE PRACTITIONER

## 2021-06-17 PROCEDURE — 99213 PR OFFICE/OUTPT VISIT, EST, LEVL III, 20-29 MIN: ICD-10-PCS | Mod: 25,S$GLB,, | Performed by: NURSE PRACTITIONER

## 2021-06-17 PROCEDURE — 1125F AMNT PAIN NOTED PAIN PRSNT: CPT | Mod: S$GLB,,, | Performed by: NURSE PRACTITIONER

## 2021-06-17 PROCEDURE — 1159F MED LIST DOCD IN RCRD: CPT | Mod: S$GLB,,, | Performed by: NURSE PRACTITIONER

## 2021-06-17 PROCEDURE — 1159F PR MEDICATION LIST DOCUMENTED IN MEDICAL RECORD: ICD-10-PCS | Mod: S$GLB,,, | Performed by: NURSE PRACTITIONER

## 2021-06-17 PROCEDURE — 81002 URINALYSIS NONAUTO W/O SCOPE: CPT | Mod: S$GLB,,, | Performed by: NURSE PRACTITIONER

## 2021-06-17 PROCEDURE — 1170F PR FUNCTIONAL STATUS ASSESSED: ICD-10-PCS | Mod: S$GLB,,, | Performed by: NURSE PRACTITIONER

## 2021-06-17 PROCEDURE — 1101F PT FALLS ASSESS-DOCD LE1/YR: CPT | Mod: CPTII,S$GLB,, | Performed by: NURSE PRACTITIONER

## 2021-06-17 PROCEDURE — 1101F PR PT FALLS ASSESS DOC 0-1 FALLS W/OUT INJ PAST YR: ICD-10-PCS | Mod: CPTII,S$GLB,, | Performed by: NURSE PRACTITIONER

## 2021-06-17 PROCEDURE — 81002 POCT URINE DIPSTICK WITHOUT MICROSCOPE: ICD-10-PCS | Mod: S$GLB,,, | Performed by: NURSE PRACTITIONER

## 2021-06-17 PROCEDURE — 81001 URINALYSIS AUTO W/SCOPE: CPT | Performed by: NURSE PRACTITIONER

## 2021-06-17 PROCEDURE — 3288F PR FALLS RISK ASSESSMENT DOCUMENTED: ICD-10-PCS | Mod: CPTII,S$GLB,, | Performed by: NURSE PRACTITIONER

## 2021-06-17 PROCEDURE — 1170F FXNL STATUS ASSESSED: CPT | Mod: S$GLB,,, | Performed by: NURSE PRACTITIONER

## 2021-06-17 PROCEDURE — 99999 PR PBB SHADOW E&M-EST. PATIENT-LVL IV: ICD-10-PCS | Mod: PBBFAC,,, | Performed by: NURSE PRACTITIONER

## 2021-06-17 PROCEDURE — 1125F PR PAIN SEVERITY QUANTIFIED, PAIN PRESENT: ICD-10-PCS | Mod: S$GLB,,, | Performed by: NURSE PRACTITIONER

## 2021-06-17 RX ORDER — NITROFURANTOIN 25; 75 MG/1; MG/1
100 CAPSULE ORAL 2 TIMES DAILY
Qty: 14 CAPSULE | Refills: 0 | Status: SHIPPED | OUTPATIENT
Start: 2021-06-17 | End: 2021-06-24

## 2021-06-17 RX ORDER — EMPAGLIFLOZIN 10 MG/1
10 TABLET, FILM COATED ORAL DAILY
COMMUNITY
Start: 2021-06-04 | End: 2023-03-14

## 2021-06-18 LAB
AMORPH CRY UR QL COMP ASSIST: ABNORMAL
BACTERIA #/AREA URNS AUTO: ABNORMAL /HPF
BACTERIA UR CULT: NORMAL
BACTERIA UR CULT: NORMAL
BILIRUB UR QL STRIP: NEGATIVE
CAOX CRY UR QL COMP ASSIST: ABNORMAL
CLARITY UR REFRACT.AUTO: ABNORMAL
COLOR UR AUTO: ABNORMAL
GLUCOSE UR QL STRIP: ABNORMAL
GRAN CASTS UR QL COMP ASSIST: 1 /LPF
HGB UR QL STRIP: NEGATIVE
KETONES UR QL STRIP: ABNORMAL
LEUKOCYTE ESTERASE UR QL STRIP: ABNORMAL
MICROSCOPIC COMMENT: ABNORMAL
NITRITE UR QL STRIP: NEGATIVE
PH UR STRIP: 6 [PH] (ref 5–8)
PROT UR QL STRIP: NEGATIVE
RBC #/AREA URNS AUTO: 2 /HPF (ref 0–4)
SP GR UR STRIP: 1.03 (ref 1–1.03)
SQUAMOUS #/AREA URNS AUTO: 2 /HPF
URN SPEC COLLECT METH UR: ABNORMAL
WBC #/AREA URNS AUTO: 52 /HPF (ref 0–5)
WBC CLUMPS UR QL AUTO: ABNORMAL
YEAST UR QL AUTO: ABNORMAL

## 2021-06-21 ENCOUNTER — TELEPHONE (OUTPATIENT)
Dept: FAMILY MEDICINE | Facility: CLINIC | Age: 76
End: 2021-06-21

## 2021-06-21 DIAGNOSIS — R30.0 DYSURIA: Primary | ICD-10-CM

## 2021-06-21 RX ORDER — PHENAZOPYRIDINE HYDROCHLORIDE 100 MG/1
100 TABLET, FILM COATED ORAL 3 TIMES DAILY PRN
Qty: 45 TABLET | Refills: 0 | Status: SHIPPED | OUTPATIENT
Start: 2021-06-21 | End: 2021-07-01

## 2021-07-09 ENCOUNTER — PATIENT OUTREACH (OUTPATIENT)
Dept: ADMINISTRATIVE | Facility: OTHER | Age: 76
End: 2021-07-09

## 2021-07-12 ENCOUNTER — OFFICE VISIT (OUTPATIENT)
Dept: UROLOGY | Facility: CLINIC | Age: 76
End: 2021-07-12
Payer: MEDICARE

## 2021-07-12 VITALS
HEART RATE: 83 BPM | DIASTOLIC BLOOD PRESSURE: 75 MMHG | WEIGHT: 151.81 LBS | HEIGHT: 64 IN | SYSTOLIC BLOOD PRESSURE: 128 MMHG | BODY MASS INDEX: 25.92 KG/M2

## 2021-07-12 DIAGNOSIS — N95.2 ATROPHIC VAGINITIS: Primary | ICD-10-CM

## 2021-07-12 DIAGNOSIS — N39.3 SUI (STRESS URINARY INCONTINENCE, FEMALE): ICD-10-CM

## 2021-07-12 DIAGNOSIS — R82.90 UNSPECIFIED ABNORMAL FINDINGS IN URINE: ICD-10-CM

## 2021-07-12 DIAGNOSIS — R10.2 PELVIC PAIN: ICD-10-CM

## 2021-07-12 DIAGNOSIS — R10.2 VAGINAL PAIN: ICD-10-CM

## 2021-07-12 LAB
BACTERIA #/AREA URNS HPF: NORMAL /HPF
BILIRUB SERPL-MCNC: ABNORMAL MG/DL
BILIRUB UR QL STRIP: NEGATIVE
BLOOD URINE, POC: ABNORMAL
CLARITY UR: CLEAR
CLARITY, POC UA: CLEAR
COLOR UR: YELLOW
COLOR, POC UA: YELLOW
GLUCOSE UR QL STRIP: 500
GLUCOSE UR QL STRIP: ABNORMAL
HGB UR QL STRIP: ABNORMAL
KETONES UR QL STRIP: ABNORMAL
KETONES UR QL STRIP: ABNORMAL
LEUKOCYTE ESTERASE UR QL STRIP: NEGATIVE
LEUKOCYTE ESTERASE URINE, POC: ABNORMAL
MICROSCOPIC COMMENT: NORMAL
NITRITE UR QL STRIP: NEGATIVE
NITRITE, POC UA: ABNORMAL
PH UR STRIP: 6 [PH] (ref 5–8)
PH, POC UA: 5.5
POC RESIDUAL URINE VOLUME: 23 ML (ref 0–100)
PROT UR QL STRIP: NEGATIVE
PROTEIN, POC: ABNORMAL
RBC #/AREA URNS HPF: 1 /HPF (ref 0–4)
SP GR UR STRIP: 1.02 (ref 1–1.03)
SPECIFIC GRAVITY, POC UA: 1.02
SQUAMOUS #/AREA URNS HPF: 0 /HPF
URN SPEC COLLECT METH UR: ABNORMAL
UROBILINOGEN UR STRIP-ACNC: NEGATIVE EU/DL
UROBILINOGEN, POC UA: ABNORMAL
YEAST URNS QL MICRO: NORMAL

## 2021-07-12 PROCEDURE — 81002 POCT URINE DIPSTICK WITHOUT MICROSCOPE: ICD-10-PCS | Mod: S$GLB,,, | Performed by: UROLOGY

## 2021-07-12 PROCEDURE — 1159F MED LIST DOCD IN RCRD: CPT | Mod: S$GLB,,, | Performed by: UROLOGY

## 2021-07-12 PROCEDURE — 87186 SC STD MICRODIL/AGAR DIL: CPT | Performed by: UROLOGY

## 2021-07-12 PROCEDURE — 99205 OFFICE O/P NEW HI 60 MIN: CPT | Mod: 25,S$GLB,, | Performed by: UROLOGY

## 2021-07-12 PROCEDURE — 99999 PR PBB SHADOW E&M-EST. PATIENT-LVL IV: ICD-10-PCS | Mod: PBBFAC,,, | Performed by: UROLOGY

## 2021-07-12 PROCEDURE — 99205 PR OFFICE/OUTPT VISIT, NEW, LEVL V, 60-74 MIN: ICD-10-PCS | Mod: 25,S$GLB,, | Performed by: UROLOGY

## 2021-07-12 PROCEDURE — 99999 PR PBB SHADOW E&M-EST. PATIENT-LVL IV: CPT | Mod: PBBFAC,,, | Performed by: UROLOGY

## 2021-07-12 PROCEDURE — 87086 URINE CULTURE/COLONY COUNT: CPT | Performed by: UROLOGY

## 2021-07-12 PROCEDURE — 51798 POCT BLADDER SCAN: ICD-10-PCS | Mod: S$GLB,,, | Performed by: UROLOGY

## 2021-07-12 PROCEDURE — 87088 URINE BACTERIA CULTURE: CPT | Performed by: UROLOGY

## 2021-07-12 PROCEDURE — 87077 CULTURE AEROBIC IDENTIFY: CPT | Performed by: UROLOGY

## 2021-07-12 PROCEDURE — 1159F PR MEDICATION LIST DOCUMENTED IN MEDICAL RECORD: ICD-10-PCS | Mod: S$GLB,,, | Performed by: UROLOGY

## 2021-07-12 PROCEDURE — 51798 US URINE CAPACITY MEASURE: CPT | Mod: S$GLB,,, | Performed by: UROLOGY

## 2021-07-12 PROCEDURE — 81000 URINALYSIS NONAUTO W/SCOPE: CPT | Performed by: UROLOGY

## 2021-07-12 PROCEDURE — 81002 URINALYSIS NONAUTO W/O SCOPE: CPT | Mod: S$GLB,,, | Performed by: UROLOGY

## 2021-07-12 RX ORDER — FLUCONAZOLE 150 MG/1
150 TABLET ORAL DAILY
Qty: 2 TABLET | Refills: 1 | Status: SHIPPED | OUTPATIENT
Start: 2021-07-12

## 2021-07-12 RX ORDER — ESTRADIOL 0.1 MG/G
1 CREAM VAGINAL
Qty: 42.5 G | Refills: 6 | Status: SHIPPED | OUTPATIENT
Start: 2021-07-12 | End: 2021-08-11

## 2021-07-14 LAB — BACTERIA UR CULT: ABNORMAL

## 2021-07-14 RX ORDER — NITROFURANTOIN 25; 75 MG/1; MG/1
100 CAPSULE ORAL 2 TIMES DAILY
Qty: 14 CAPSULE | Refills: 0 | Status: SHIPPED | OUTPATIENT
Start: 2021-07-14 | End: 2021-07-21

## 2021-07-16 ENCOUNTER — TELEPHONE (OUTPATIENT)
Dept: FAMILY MEDICINE | Facility: CLINIC | Age: 76
End: 2021-07-16

## 2021-07-16 ENCOUNTER — OFFICE VISIT (OUTPATIENT)
Dept: FAMILY MEDICINE | Facility: CLINIC | Age: 76
End: 2021-07-16
Payer: MEDICARE

## 2021-07-16 VITALS
TEMPERATURE: 98 F | SYSTOLIC BLOOD PRESSURE: 130 MMHG | HEART RATE: 89 BPM | DIASTOLIC BLOOD PRESSURE: 68 MMHG | OXYGEN SATURATION: 95 % | HEIGHT: 64 IN | BODY MASS INDEX: 27.03 KG/M2 | WEIGHT: 158.31 LBS

## 2021-07-16 DIAGNOSIS — E11.59 HYPERTENSION ASSOCIATED WITH DIABETES: Primary | ICD-10-CM

## 2021-07-16 DIAGNOSIS — Z12.11 COLON CANCER SCREENING: ICD-10-CM

## 2021-07-16 DIAGNOSIS — L98.9 SKIN LESION: ICD-10-CM

## 2021-07-16 DIAGNOSIS — C73 CANCER OF THYROID: ICD-10-CM

## 2021-07-16 DIAGNOSIS — E78.5 TYPE 2 DIABETES MELLITUS WITH HYPERLIPIDEMIA: ICD-10-CM

## 2021-07-16 DIAGNOSIS — I15.2 HYPERTENSION ASSOCIATED WITH DIABETES: Primary | ICD-10-CM

## 2021-07-16 DIAGNOSIS — E11.69 TYPE 2 DIABETES MELLITUS WITH HYPERLIPIDEMIA: ICD-10-CM

## 2021-07-16 PROCEDURE — 1126F AMNT PAIN NOTED NONE PRSNT: CPT | Mod: S$GLB,,, | Performed by: FAMILY MEDICINE

## 2021-07-16 PROCEDURE — 3288F PR FALLS RISK ASSESSMENT DOCUMENTED: ICD-10-PCS | Mod: CPTII,S$GLB,, | Performed by: FAMILY MEDICINE

## 2021-07-16 PROCEDURE — 99999 PR PBB SHADOW E&M-EST. PATIENT-LVL V: ICD-10-PCS | Mod: PBBFAC,,, | Performed by: FAMILY MEDICINE

## 2021-07-16 PROCEDURE — 3288F FALL RISK ASSESSMENT DOCD: CPT | Mod: CPTII,S$GLB,, | Performed by: FAMILY MEDICINE

## 2021-07-16 PROCEDURE — 99499 UNLISTED E&M SERVICE: CPT | Mod: S$GLB,,, | Performed by: FAMILY MEDICINE

## 2021-07-16 PROCEDURE — 99499 RISK ADDL DX/OHS AUDIT: ICD-10-PCS | Mod: S$GLB,,, | Performed by: FAMILY MEDICINE

## 2021-07-16 PROCEDURE — 3075F SYST BP GE 130 - 139MM HG: CPT | Mod: CPTII,S$GLB,, | Performed by: FAMILY MEDICINE

## 2021-07-16 PROCEDURE — 1126F PR PAIN SEVERITY QUANTIFIED, NO PAIN PRESENT: ICD-10-PCS | Mod: S$GLB,,, | Performed by: FAMILY MEDICINE

## 2021-07-16 PROCEDURE — 1159F PR MEDICATION LIST DOCUMENTED IN MEDICAL RECORD: ICD-10-PCS | Mod: S$GLB,,, | Performed by: FAMILY MEDICINE

## 2021-07-16 PROCEDURE — 1159F MED LIST DOCD IN RCRD: CPT | Mod: S$GLB,,, | Performed by: FAMILY MEDICINE

## 2021-07-16 PROCEDURE — 3078F DIAST BP <80 MM HG: CPT | Mod: CPTII,S$GLB,, | Performed by: FAMILY MEDICINE

## 2021-07-16 PROCEDURE — 99214 OFFICE O/P EST MOD 30 MIN: CPT | Mod: S$GLB,,, | Performed by: FAMILY MEDICINE

## 2021-07-16 PROCEDURE — 99214 PR OFFICE/OUTPT VISIT, EST, LEVL IV, 30-39 MIN: ICD-10-PCS | Mod: S$GLB,,, | Performed by: FAMILY MEDICINE

## 2021-07-16 PROCEDURE — 1101F PT FALLS ASSESS-DOCD LE1/YR: CPT | Mod: CPTII,S$GLB,, | Performed by: FAMILY MEDICINE

## 2021-07-16 PROCEDURE — 1101F PR PT FALLS ASSESS DOC 0-1 FALLS W/OUT INJ PAST YR: ICD-10-PCS | Mod: CPTII,S$GLB,, | Performed by: FAMILY MEDICINE

## 2021-07-16 PROCEDURE — 3078F PR MOST RECENT DIASTOLIC BLOOD PRESSURE < 80 MM HG: ICD-10-PCS | Mod: CPTII,S$GLB,, | Performed by: FAMILY MEDICINE

## 2021-07-16 PROCEDURE — 3075F PR MOST RECENT SYSTOLIC BLOOD PRESS GE 130-139MM HG: ICD-10-PCS | Mod: CPTII,S$GLB,, | Performed by: FAMILY MEDICINE

## 2021-07-16 PROCEDURE — 99999 PR PBB SHADOW E&M-EST. PATIENT-LVL V: CPT | Mod: PBBFAC,,, | Performed by: FAMILY MEDICINE

## 2021-07-19 ENCOUNTER — TELEPHONE (OUTPATIENT)
Dept: GASTROENTEROLOGY | Facility: CLINIC | Age: 76
End: 2021-07-19

## 2021-07-20 RX ORDER — GLIMEPIRIDE 4 MG/1
TABLET ORAL
Qty: 90 TABLET | Refills: 0 | Status: SHIPPED | OUTPATIENT
Start: 2021-07-20 | End: 2021-10-12

## 2021-08-04 ENCOUNTER — PATIENT MESSAGE (OUTPATIENT)
Dept: ADMINISTRATIVE | Facility: HOSPITAL | Age: 76
End: 2021-08-04

## 2021-09-14 ENCOUNTER — PATIENT OUTREACH (OUTPATIENT)
Dept: ADMINISTRATIVE | Facility: OTHER | Age: 76
End: 2021-09-14

## 2021-09-14 DIAGNOSIS — E11.69 TYPE 2 DIABETES MELLITUS WITH HYPERLIPIDEMIA: Primary | ICD-10-CM

## 2021-09-14 DIAGNOSIS — E78.5 TYPE 2 DIABETES MELLITUS WITH HYPERLIPIDEMIA: Primary | ICD-10-CM

## 2021-09-15 ENCOUNTER — OFFICE VISIT (OUTPATIENT)
Dept: DERMATOLOGY | Facility: CLINIC | Age: 76
End: 2021-09-15
Payer: MEDICARE

## 2021-09-15 DIAGNOSIS — L90.5 SCAR: ICD-10-CM

## 2021-09-15 DIAGNOSIS — L81.4 LENTIGO: ICD-10-CM

## 2021-09-15 DIAGNOSIS — L82.1 SEBORRHEIC KERATOSES: ICD-10-CM

## 2021-09-15 DIAGNOSIS — D22.9 MULTIPLE BENIGN NEVI: ICD-10-CM

## 2021-09-15 DIAGNOSIS — L82.0 SEBORRHEIC KERATOSES, INFLAMED: Primary | ICD-10-CM

## 2021-09-15 DIAGNOSIS — Z85.828 HISTORY OF NONMELANOMA SKIN CANCER: ICD-10-CM

## 2021-09-15 DIAGNOSIS — L65.9 ALOPECIA: ICD-10-CM

## 2021-09-15 DIAGNOSIS — D48.5 NEOPLASM OF UNCERTAIN BEHAVIOR OF SKIN: ICD-10-CM

## 2021-09-15 DIAGNOSIS — D18.01 CHERRY ANGIOMA: ICD-10-CM

## 2021-09-15 PROCEDURE — 99999 PR PBB SHADOW E&M-EST. PATIENT-LVL IV: CPT | Mod: PBBFAC,,, | Performed by: STUDENT IN AN ORGANIZED HEALTH CARE EDUCATION/TRAINING PROGRAM

## 2021-09-15 PROCEDURE — 1159F MED LIST DOCD IN RCRD: CPT | Mod: CPTII,S$GLB,, | Performed by: STUDENT IN AN ORGANIZED HEALTH CARE EDUCATION/TRAINING PROGRAM

## 2021-09-15 PROCEDURE — 11102 TANGNTL BX SKIN SINGLE LES: CPT | Mod: S$GLB,,, | Performed by: STUDENT IN AN ORGANIZED HEALTH CARE EDUCATION/TRAINING PROGRAM

## 2021-09-15 PROCEDURE — 1159F PR MEDICATION LIST DOCUMENTED IN MEDICAL RECORD: ICD-10-PCS | Mod: CPTII,S$GLB,, | Performed by: STUDENT IN AN ORGANIZED HEALTH CARE EDUCATION/TRAINING PROGRAM

## 2021-09-15 PROCEDURE — 1160F PR REVIEW ALL MEDS BY PRESCRIBER/CLIN PHARMACIST DOCUMENTED: ICD-10-PCS | Mod: CPTII,S$GLB,, | Performed by: STUDENT IN AN ORGANIZED HEALTH CARE EDUCATION/TRAINING PROGRAM

## 2021-09-15 PROCEDURE — 99999 PR PBB SHADOW E&M-EST. PATIENT-LVL IV: ICD-10-PCS | Mod: PBBFAC,,, | Performed by: STUDENT IN AN ORGANIZED HEALTH CARE EDUCATION/TRAINING PROGRAM

## 2021-09-15 PROCEDURE — 1101F PR PT FALLS ASSESS DOC 0-1 FALLS W/OUT INJ PAST YR: ICD-10-PCS | Mod: CPTII,S$GLB,, | Performed by: STUDENT IN AN ORGANIZED HEALTH CARE EDUCATION/TRAINING PROGRAM

## 2021-09-15 PROCEDURE — 1126F AMNT PAIN NOTED NONE PRSNT: CPT | Mod: CPTII,S$GLB,, | Performed by: STUDENT IN AN ORGANIZED HEALTH CARE EDUCATION/TRAINING PROGRAM

## 2021-09-15 PROCEDURE — 88305 TISSUE EXAM BY PATHOLOGIST: CPT | Performed by: PATHOLOGY

## 2021-09-15 PROCEDURE — 99203 PR OFFICE/OUTPT VISIT, NEW, LEVL III, 30-44 MIN: ICD-10-PCS | Mod: 25,S$GLB,, | Performed by: STUDENT IN AN ORGANIZED HEALTH CARE EDUCATION/TRAINING PROGRAM

## 2021-09-15 PROCEDURE — 3288F FALL RISK ASSESSMENT DOCD: CPT | Mod: CPTII,S$GLB,, | Performed by: STUDENT IN AN ORGANIZED HEALTH CARE EDUCATION/TRAINING PROGRAM

## 2021-09-15 PROCEDURE — 3288F PR FALLS RISK ASSESSMENT DOCUMENTED: ICD-10-PCS | Mod: CPTII,S$GLB,, | Performed by: STUDENT IN AN ORGANIZED HEALTH CARE EDUCATION/TRAINING PROGRAM

## 2021-09-15 PROCEDURE — 11102 PR TANGENTIAL BIOPSY, SKIN, SINGLE LESION: ICD-10-PCS | Mod: S$GLB,,, | Performed by: STUDENT IN AN ORGANIZED HEALTH CARE EDUCATION/TRAINING PROGRAM

## 2021-09-15 PROCEDURE — 99203 OFFICE O/P NEW LOW 30 MIN: CPT | Mod: 25,S$GLB,, | Performed by: STUDENT IN AN ORGANIZED HEALTH CARE EDUCATION/TRAINING PROGRAM

## 2021-09-15 PROCEDURE — 1126F PR PAIN SEVERITY QUANTIFIED, NO PAIN PRESENT: ICD-10-PCS | Mod: CPTII,S$GLB,, | Performed by: STUDENT IN AN ORGANIZED HEALTH CARE EDUCATION/TRAINING PROGRAM

## 2021-09-15 PROCEDURE — 1160F RVW MEDS BY RX/DR IN RCRD: CPT | Mod: CPTII,S$GLB,, | Performed by: STUDENT IN AN ORGANIZED HEALTH CARE EDUCATION/TRAINING PROGRAM

## 2021-09-15 PROCEDURE — 1101F PT FALLS ASSESS-DOCD LE1/YR: CPT | Mod: CPTII,S$GLB,, | Performed by: STUDENT IN AN ORGANIZED HEALTH CARE EDUCATION/TRAINING PROGRAM

## 2021-09-16 PROCEDURE — 88305 TISSUE EXAM BY PATHOLOGIST: CPT | Mod: 26,,, | Performed by: PATHOLOGY

## 2021-09-16 PROCEDURE — 88305 TISSUE EXAM BY PATHOLOGIST: ICD-10-PCS | Mod: 26,,, | Performed by: PATHOLOGY

## 2021-09-17 LAB
FINAL PATHOLOGIC DIAGNOSIS: NORMAL
GROSS: NORMAL
Lab: NORMAL
MICROSCOPIC EXAM: NORMAL

## 2021-09-23 ENCOUNTER — TELEPHONE (OUTPATIENT)
Dept: DERMATOLOGY | Facility: CLINIC | Age: 76
End: 2021-09-23

## 2021-10-01 ENCOUNTER — TELEPHONE (OUTPATIENT)
Dept: GASTROENTEROLOGY | Facility: CLINIC | Age: 76
End: 2021-10-01

## 2021-10-08 ENCOUNTER — TELEPHONE (OUTPATIENT)
Dept: GASTROENTEROLOGY | Facility: CLINIC | Age: 76
End: 2021-10-08

## 2021-10-12 ENCOUNTER — OFFICE VISIT (OUTPATIENT)
Dept: FAMILY MEDICINE | Facility: CLINIC | Age: 76
End: 2021-10-12
Payer: MEDICARE

## 2021-10-12 VITALS
RESPIRATION RATE: 14 BRPM | SYSTOLIC BLOOD PRESSURE: 118 MMHG | HEIGHT: 64 IN | HEART RATE: 82 BPM | OXYGEN SATURATION: 96 % | BODY MASS INDEX: 27.33 KG/M2 | DIASTOLIC BLOOD PRESSURE: 60 MMHG | WEIGHT: 160.06 LBS

## 2021-10-12 DIAGNOSIS — E78.5 TYPE 2 DIABETES MELLITUS WITH HYPERLIPIDEMIA: ICD-10-CM

## 2021-10-12 DIAGNOSIS — N95.1 HOT FLASHES DUE TO MENOPAUSE: ICD-10-CM

## 2021-10-12 DIAGNOSIS — Z12.11 COLON CANCER SCREENING: Primary | ICD-10-CM

## 2021-10-12 DIAGNOSIS — L98.9 SKIN LESION OF LEFT LOWER EXTREMITY: ICD-10-CM

## 2021-10-12 DIAGNOSIS — E11.69 TYPE 2 DIABETES MELLITUS WITH HYPERLIPIDEMIA: ICD-10-CM

## 2021-10-12 PROCEDURE — 99213 PR OFFICE/OUTPT VISIT, EST, LEVL III, 20-29 MIN: ICD-10-PCS | Mod: HCNC,S$GLB,, | Performed by: FAMILY MEDICINE

## 2021-10-12 PROCEDURE — 3074F PR MOST RECENT SYSTOLIC BLOOD PRESSURE < 130 MM HG: ICD-10-PCS | Mod: HCNC,CPTII,S$GLB, | Performed by: FAMILY MEDICINE

## 2021-10-12 PROCEDURE — 1101F PT FALLS ASSESS-DOCD LE1/YR: CPT | Mod: HCNC,CPTII,S$GLB, | Performed by: FAMILY MEDICINE

## 2021-10-12 PROCEDURE — 1159F PR MEDICATION LIST DOCUMENTED IN MEDICAL RECORD: ICD-10-PCS | Mod: HCNC,CPTII,S$GLB, | Performed by: FAMILY MEDICINE

## 2021-10-12 PROCEDURE — 99213 OFFICE O/P EST LOW 20 MIN: CPT | Mod: HCNC,S$GLB,, | Performed by: FAMILY MEDICINE

## 2021-10-12 PROCEDURE — 3288F FALL RISK ASSESSMENT DOCD: CPT | Mod: HCNC,CPTII,S$GLB, | Performed by: FAMILY MEDICINE

## 2021-10-12 PROCEDURE — 3074F SYST BP LT 130 MM HG: CPT | Mod: HCNC,CPTII,S$GLB, | Performed by: FAMILY MEDICINE

## 2021-10-12 PROCEDURE — 1101F PR PT FALLS ASSESS DOC 0-1 FALLS W/OUT INJ PAST YR: ICD-10-PCS | Mod: HCNC,CPTII,S$GLB, | Performed by: FAMILY MEDICINE

## 2021-10-12 PROCEDURE — 3078F PR MOST RECENT DIASTOLIC BLOOD PRESSURE < 80 MM HG: ICD-10-PCS | Mod: HCNC,CPTII,S$GLB, | Performed by: FAMILY MEDICINE

## 2021-10-12 PROCEDURE — 1126F PR PAIN SEVERITY QUANTIFIED, NO PAIN PRESENT: ICD-10-PCS | Mod: HCNC,CPTII,S$GLB, | Performed by: FAMILY MEDICINE

## 2021-10-12 PROCEDURE — 1126F AMNT PAIN NOTED NONE PRSNT: CPT | Mod: HCNC,CPTII,S$GLB, | Performed by: FAMILY MEDICINE

## 2021-10-12 PROCEDURE — 3078F DIAST BP <80 MM HG: CPT | Mod: HCNC,CPTII,S$GLB, | Performed by: FAMILY MEDICINE

## 2021-10-12 PROCEDURE — 3288F PR FALLS RISK ASSESSMENT DOCUMENTED: ICD-10-PCS | Mod: HCNC,CPTII,S$GLB, | Performed by: FAMILY MEDICINE

## 2021-10-12 PROCEDURE — 99999 PR PBB SHADOW E&M-EST. PATIENT-LVL V: CPT | Mod: PBBFAC,HCNC,, | Performed by: FAMILY MEDICINE

## 2021-10-12 PROCEDURE — 1159F MED LIST DOCD IN RCRD: CPT | Mod: HCNC,CPTII,S$GLB, | Performed by: FAMILY MEDICINE

## 2021-10-12 PROCEDURE — 99999 PR PBB SHADOW E&M-EST. PATIENT-LVL V: ICD-10-PCS | Mod: PBBFAC,HCNC,, | Performed by: FAMILY MEDICINE

## 2021-10-12 RX ORDER — BLACK COHOSH ROOT 540 MG
1 CAPSULE ORAL DAILY
Qty: 30 EACH | Refills: 0 | Status: SHIPPED | OUTPATIENT
Start: 2021-10-12 | End: 2021-11-11

## 2021-10-12 RX ORDER — GLIMEPIRIDE 4 MG/1
TABLET ORAL
Qty: 90 TABLET | Refills: 0 | Status: SHIPPED | OUTPATIENT
Start: 2021-10-12 | End: 2022-02-14 | Stop reason: SDUPTHER

## 2021-10-14 ENCOUNTER — TELEPHONE (OUTPATIENT)
Dept: FAMILY MEDICINE | Facility: CLINIC | Age: 76
End: 2021-10-14

## 2021-10-15 ENCOUNTER — TELEPHONE (OUTPATIENT)
Dept: FAMILY MEDICINE | Facility: CLINIC | Age: 76
End: 2021-10-15

## 2021-10-18 ENCOUNTER — PATIENT MESSAGE (OUTPATIENT)
Dept: ADMINISTRATIVE | Facility: HOSPITAL | Age: 76
End: 2021-10-18
Payer: MEDICARE

## 2022-02-21 ENCOUNTER — TELEPHONE (OUTPATIENT)
Dept: FAMILY MEDICINE | Facility: CLINIC | Age: 77
End: 2022-02-21
Payer: MEDICARE

## 2022-02-21 DIAGNOSIS — E11.59 HYPERTENSION ASSOCIATED WITH DIABETES: ICD-10-CM

## 2022-02-21 DIAGNOSIS — I15.2 HYPERTENSION ASSOCIATED WITH DIABETES: ICD-10-CM

## 2022-02-21 RX ORDER — ATENOLOL 50 MG/1
50 TABLET ORAL DAILY
Qty: 90 TABLET | Refills: 3 | Status: SHIPPED | OUTPATIENT
Start: 2022-02-21 | End: 2023-06-27 | Stop reason: SDUPTHER

## 2022-02-21 NOTE — TELEPHONE ENCOUNTER
----- Message from Preet Jasmine sent at 2/21/2022 12:46 PM CST -----  Type:  RX Refill Request    Who Called:  Pt  Refill or New Rx:  refill  RX Name and Strength:  atenoloL (TENORMIN) 50 MG tablet  How is the patient currently taking it? (ex. 1XDay):  as directed  Is this a 30 day or 90 day RX:  30  Preferred Pharmacy with phone number:      [facility]/pharmacy #[id number] - [place] - [address]  [address] [address]  Phone: [phone] Fax: [phone]      Local or Mail Order:  Local  Ordering Provider:  Abel Pryor Call Back Number:  [phone]      Additional Information:  Please advise -- Thank you

## 2022-02-21 NOTE — TELEPHONE ENCOUNTER
----- Message from Preet Jasmine sent at 2/21/2022 12:42 PM CST -----  Type: Needs Medical Advice  Who Called:  Pt    Best Call Back Number: [phone]  Additional Information: Pt would like to get orders to get a Mammo scheduled.  Please advise -- Thank you

## 2022-03-09 ENCOUNTER — TELEPHONE (OUTPATIENT)
Dept: FAMILY MEDICINE | Facility: CLINIC | Age: 77
End: 2022-03-09
Payer: MEDICARE

## 2022-03-09 DIAGNOSIS — Z12.31 BREAST CANCER SCREENING BY MAMMOGRAM: Primary | ICD-10-CM

## 2022-03-09 NOTE — TELEPHONE ENCOUNTER
Last screening mammogram performed on 7-.  Screening mammogram orders placed. Mammogram appointment scheduled for the date of 3-. Patient agreed to appointment date, time, and location.

## 2022-03-09 NOTE — TELEPHONE ENCOUNTER
----- Message from Pamela Dougherty sent at 3/9/2022  9:41 AM CST -----  Type: Needs Medical Advice  Who Called:  Pt  Best Call Back Number: [phone]  Additional Information: pt requesting mammo orders to be placed and return call to schedule--please advise--Thank you

## 2022-03-22 ENCOUNTER — HOSPITAL ENCOUNTER (OUTPATIENT)
Dept: RADIOLOGY | Facility: CLINIC | Age: 77
Discharge: HOME | End: 2022-03-22
Attending: FAMILY MEDICINE
Payer: MEDICARE

## 2022-03-22 DIAGNOSIS — Z12.31 BREAST CANCER SCREENING BY MAMMOGRAM: ICD-10-CM

## 2022-03-22 PROCEDURE — 77063 BREAST TOMOSYNTHESIS BI: CPT | Mod: 26,,, | Performed by: RADIOLOGY

## 2022-03-22 PROCEDURE — 77067 SCR MAMMO BI INCL CAD: CPT | Mod: 26,,, | Performed by: RADIOLOGY

## 2022-03-22 PROCEDURE — 77067 MAMMO DIGITAL SCREENING BILAT WITH TOMO: ICD-10-PCS | Mod: 26,,, | Performed by: RADIOLOGY

## 2022-03-22 PROCEDURE — 77063 BREAST TOMOSYNTHESIS BI: CPT | Mod: TC,PO

## 2022-03-22 PROCEDURE — 77063 MAMMO DIGITAL SCREENING BILAT WITH TOMO: ICD-10-PCS | Mod: 26,,, | Performed by: RADIOLOGY

## 2022-03-22 PROCEDURE — 77067 SCR MAMMO BI INCL CAD: CPT | Mod: TC,PO

## 2022-04-12 ENCOUNTER — OFFICE VISIT (OUTPATIENT)
Dept: FAMILY MEDICINE | Facility: CLINIC | Age: 77
End: 2022-04-12
Payer: MEDICARE

## 2022-04-12 VITALS
BODY MASS INDEX: 27.28 KG/M2 | DIASTOLIC BLOOD PRESSURE: 72 MMHG | OXYGEN SATURATION: 98 % | HEART RATE: 78 BPM | SYSTOLIC BLOOD PRESSURE: 128 MMHG | HEIGHT: 64 IN | TEMPERATURE: 98 F | WEIGHT: 159.81 LBS

## 2022-04-12 DIAGNOSIS — Z78.0 ASYMPTOMATIC MENOPAUSAL STATE: ICD-10-CM

## 2022-04-12 DIAGNOSIS — E11.69 TYPE 2 DIABETES MELLITUS WITH HYPERLIPIDEMIA: ICD-10-CM

## 2022-04-12 DIAGNOSIS — E78.5 TYPE 2 DIABETES MELLITUS WITH HYPERLIPIDEMIA: ICD-10-CM

## 2022-04-12 DIAGNOSIS — Z12.11 COLON CANCER SCREENING: Primary | ICD-10-CM

## 2022-04-12 PROCEDURE — 99213 PR OFFICE/OUTPT VISIT, EST, LEVL III, 20-29 MIN: ICD-10-PCS | Mod: S$GLB,,, | Performed by: FAMILY MEDICINE

## 2022-04-12 PROCEDURE — 99499 UNLISTED E&M SERVICE: CPT | Mod: S$GLB,,, | Performed by: FAMILY MEDICINE

## 2022-04-12 PROCEDURE — 1126F AMNT PAIN NOTED NONE PRSNT: CPT | Mod: CPTII,S$GLB,, | Performed by: FAMILY MEDICINE

## 2022-04-12 PROCEDURE — 3288F FALL RISK ASSESSMENT DOCD: CPT | Mod: CPTII,S$GLB,, | Performed by: FAMILY MEDICINE

## 2022-04-12 PROCEDURE — 3078F DIAST BP <80 MM HG: CPT | Mod: CPTII,S$GLB,, | Performed by: FAMILY MEDICINE

## 2022-04-12 PROCEDURE — 3074F PR MOST RECENT SYSTOLIC BLOOD PRESSURE < 130 MM HG: ICD-10-PCS | Mod: CPTII,S$GLB,, | Performed by: FAMILY MEDICINE

## 2022-04-12 PROCEDURE — 1159F PR MEDICATION LIST DOCUMENTED IN MEDICAL RECORD: ICD-10-PCS | Mod: CPTII,S$GLB,, | Performed by: FAMILY MEDICINE

## 2022-04-12 PROCEDURE — 1126F PR PAIN SEVERITY QUANTIFIED, NO PAIN PRESENT: ICD-10-PCS | Mod: CPTII,S$GLB,, | Performed by: FAMILY MEDICINE

## 2022-04-12 PROCEDURE — 3074F SYST BP LT 130 MM HG: CPT | Mod: CPTII,S$GLB,, | Performed by: FAMILY MEDICINE

## 2022-04-12 PROCEDURE — 99213 OFFICE O/P EST LOW 20 MIN: CPT | Mod: S$GLB,,, | Performed by: FAMILY MEDICINE

## 2022-04-12 PROCEDURE — 99499 RISK ADDL DX/OHS AUDIT: ICD-10-PCS | Mod: S$GLB,,, | Performed by: FAMILY MEDICINE

## 2022-04-12 PROCEDURE — 3288F PR FALLS RISK ASSESSMENT DOCUMENTED: ICD-10-PCS | Mod: CPTII,S$GLB,, | Performed by: FAMILY MEDICINE

## 2022-04-12 PROCEDURE — 1101F PT FALLS ASSESS-DOCD LE1/YR: CPT | Mod: CPTII,S$GLB,, | Performed by: FAMILY MEDICINE

## 2022-04-12 PROCEDURE — 1159F MED LIST DOCD IN RCRD: CPT | Mod: CPTII,S$GLB,, | Performed by: FAMILY MEDICINE

## 2022-04-12 PROCEDURE — 3078F PR MOST RECENT DIASTOLIC BLOOD PRESSURE < 80 MM HG: ICD-10-PCS | Mod: CPTII,S$GLB,, | Performed by: FAMILY MEDICINE

## 2022-04-12 PROCEDURE — 1101F PR PT FALLS ASSESS DOC 0-1 FALLS W/OUT INJ PAST YR: ICD-10-PCS | Mod: CPTII,S$GLB,, | Performed by: FAMILY MEDICINE

## 2022-04-12 NOTE — PROGRESS NOTES
Subjective:       Patient ID: Lulu Eden is a 77 y.o. female.    Chief Complaint: Follow-up (6 months), Hypertension (Taking her medication as directed w/o any problems or concerns ), Diabetes (Pt seeing endocrinologist), and Hypothyroidism (Pt seeing endocrinologist)    Patient is a 77 year old female with history of type 2 diabetes presenting for routine health maintenance and to establish care, patient endorses compliance with medication regimen and denies any acute symptoms today.           Current Outpatient Medications:     aspirin 81 MG Chew, Take 1 tablet (81 mg total) by mouth once daily., Disp: , Rfl:     atenoloL (TENORMIN) 50 MG tablet, Take 1 tablet (50 mg total) by mouth once daily., Disp: 90 tablet, Rfl: 3    blood sugar diagnostic (RELION PRIME TEST STRIPS) Strp, Patient to check blood sugar twice daily., Disp: 100 strip, Rfl: 11    blood-glucose meter Misc, 1 meter per pt and insurance choice.  Pt to check glucoses 2-4 times a day, Disp: 1 each, Rfl: prn    fexofenadine-pseudoephedrine (ALLEGRA-D)  mg per tablet, PRN, Disp: , Rfl:     glimepiride (AMARYL) 4 MG tablet, TAKE 1 TABLET BY MOUTH EVERY MORNING WITH BREAKFAST, Disp: 90 tablet, Rfl: 0    JANUMET 50-1,000 mg per tablet, TAKE 1 TABLET BY MOUTH TWICE DAILY WITH MEALS, Disp: 180 tablet, Rfl: 0    JARDIANCE 10 mg tablet, Take 10 mg by mouth once daily., Disp: , Rfl:     LANCETS MISC, Patient to  check blood sugar FOUR times daily, Disp: , Rfl:     liothyronine (CYTOMEL) 5 MCG Tab, Take 5 mcg by mouth once daily., Disp: , Rfl: 5    multivitamin-minerals-lutein Tab, Take 1 tablet by mouth once daily., Disp: , Rfl:     SYNTHROID 100 mcg tablet, , Disp: , Rfl: 11    EPINEPHrine (EPIPEN) 0.3 mg/0.3 mL AtIn, Inject 0.3 mLs (0.3 mg total) into the muscle once. for 1 dose, Disp: 2 each, Rfl: 11    estradioL (ESTRACE) 0.01 % (0.1 mg/gram) vaginal cream, Place 1 g vaginally 3 (three) times a week., Disp: 42.5 g, Rfl: 6     fluconazole (DIFLUCAN) 150 MG Tab, Take 1 tablet (150 mg total) by mouth once daily. Take 1 now and if no improvement, repeat in 7 days (Patient not taking: No sig reported), Disp: 2 tablet, Rfl: 1    Review of patient's allergies indicates:   Allergen Reactions    Codeine Other (See Comments)     Other reaction(s): Stomach upset  Other reaction(s): Stomach upset    Iodine Other (See Comments)     Other reaction(s): ear pain  Other reaction(s): ear pain    Pseudoephedrine Other (See Comments)    Pseudoephedrine hcl Other (See Comments)     Other reaction(s): Hallucinations       Past Medical History:   Diagnosis Date    ALLERGIC RHINITIS     Benign hypertension     Diabetes mellitus     Diabetes mellitus type II     History of colonic polyps     Hyperlipidemia     Obstructive sleep apnea     Skin cancer 2000?    right nasal ala, doesnt  know what type. .    Skin cancer of nose 2002    right nasal ala, .    Type 2 diabetes mellitus, uncontrolled 7/11/2012       Past Surgical History:   Procedure Laterality Date    COLONOSCOPY N/A 2/11/2016    Procedure: COLONOSCOPY;  Surgeon: Lloyd Bernabe MD;  Location: [facility];  Service: Endoscopy;  Laterality: N/A;    HYSTERECTOMY      KNEE ARTHROPLASTY  2010    bilateral    TONSILLECTOMY, ADENOIDECTOMY, BILATERAL MYRINGOTOMY AND TUBES  age 2    TOTAL KNEE ARTHROPLASTY      bilateral    TOTAL VAGINAL HYSTERECTOMY  1977       Family History   Problem Relation Age of Onset    Stroke Father     Heart attack Mother     Melanoma Sister     Cancer Sister         thyroid    Cancer Sister         leukemia    Skin cancer Sister        Social History     Tobacco Use    Smoking status: Never Smoker    Smokeless tobacco: Never Used   Substance Use Topics    Alcohol use: Yes     Comment: rare    Drug use: No       Review of Systems   Constitutional: Negative for activity change, appetite change, chills, diaphoresis, fatigue, fever and  "unexpected weight change.   HENT: Negative for hearing loss, postnasal drip, rhinorrhea, sinus pressure/congestion, sore throat and trouble swallowing.    Eyes: Negative for visual disturbance.   Respiratory: Negative for apnea, chest tightness, shortness of breath and wheezing.    Cardiovascular: Negative for chest pain.   Gastrointestinal: Negative for abdominal pain, blood in stool, change in bowel habit, constipation, diarrhea, nausea, vomiting and change in bowel habit.   Endocrine: Negative for polydipsia and polyphagia.   Genitourinary: Negative for dysuria, enuresis, flank pain, frequency and urgency.   Integumentary:  Negative for rash and mole/lesion.   Neurological: Negative for dizziness, light-headedness, headaches and memory loss.   Psychiatric/Behavioral: Negative for confusion, decreased concentration, sleep disturbance and suicidal ideas. The patient is not nervous/anxious.            Objective:      Vitals:    04/12/22 1359   BP: 128/72   BP Location: Right arm   Patient Position: Sitting   BP Method: Medium (Manual)   Pulse: 78   Temp: 97.9 °F (36.6 °C)   TempSrc: Temporal   SpO2: 98%   Weight: 72.5 kg (159 lb 12.8 oz)   Height: 5' 4" (1.626 m)     Physical Exam  Constitutional:       General: She is not in acute distress.     Appearance: She is normal weight. She is not ill-appearing, toxic-appearing or diaphoretic.   HENT:      Head: Normocephalic and atraumatic.      Right Ear: External ear normal.      Left Ear: External ear normal.      Nose: Nose normal. No congestion.      Mouth/Throat:      Mouth: Mucous membranes are moist.      Pharynx: Oropharynx is clear. No oropharyngeal exudate or posterior oropharyngeal erythema.   Eyes:      General: No scleral icterus.     Extraocular Movements: Extraocular movements intact.      Conjunctiva/sclera: Conjunctivae normal.   Cardiovascular:      Rate and Rhythm: Normal rate and regular rhythm.      Pulses: Normal pulses.      Heart sounds: Normal " heart sounds. No murmur heard.    No friction rub. No gallop.   Pulmonary:      Effort: Pulmonary effort is normal. No respiratory distress.      Breath sounds: Normal breath sounds. No stridor. No wheezing, rhonchi or rales.   Chest:      Chest wall: No tenderness.   Abdominal:      General: Abdomen is flat. Bowel sounds are normal. There is no distension.      Palpations: Abdomen is soft. There is no mass.      Tenderness: There is no abdominal tenderness. There is no guarding or rebound.      Hernia: No hernia is present.   Musculoskeletal:      Cervical back: Normal range of motion.      Right lower leg: No edema.      Left lower leg: No edema.   Skin:     Coloration: Skin is not jaundiced or pale.      Findings: No bruising, erythema or rash.   Neurological:      General: No focal deficit present.      Mental Status: She is alert and oriented to person, place, and time. Mental status is at baseline.   Psychiatric:         Mood and Affect: Mood normal.         Behavior: Behavior normal.         Thought Content: Thought content normal.         Judgment: Judgment normal.           Assessment:       1. Colon cancer screening    2. Asymptomatic menopausal state    3. Type 2 diabetes mellitus with hyperlipidemia        Plan:           Colon cancer screening  -     Case Request Endoscopy: COLONOSCOPY    Asymptomatic menopausal state  -     DXA Bone Density Appendicular Skeleton; Future; Expected date: 04/12/2022    Type 2 diabetes mellitus with hyperlipidemia  -     Diabetic Eye Screening Photo; Future  -     Lipid Panel; Future; Expected date: 07/12/2022  -     Hemoglobin A1C; Future; Expected date: 07/12/2022        Follow up in about 3 months (around 7/12/2022).    Oseas Cody MD           DISCLAIMER: This note was prepared with [employer] voice recognition transcription software. Garbled syntax, mangled pronouns, and other bizarre constructions may be attributed to that software system.

## 2022-04-12 NOTE — PATIENT INSTRUCTIONS
Prosper Lawson,     If you are due for any health screening(s) below please notify me so we can arrange them to be ordered and scheduled to maintain your health. Most healthy patients complete it. Don't lose out on improving your health.     Health Maintenance   Topic Date Due    Hepatitis C Screening  Never done    Eye Exam  01/30/2021    Colonoscopy  02/11/2021    DEXA Scan  12/14/2021    Lipid Panel  01/21/2022    Hemoglobin A1c  04/28/2022    Foot Exam  07/16/2022    TETANUS VACCINE  09/06/2026                  Diabetic Retinal Eye Exam    Diabetes is the #1 cause of blindness in the [place] - early detection before signs or symptoms develop can prevent debilitating blindness.    Once-a-year screening is recommended for all diabetic patients. This exam can prevent and treat diabetes complications in the eye before developing symptoms. This can be done with a special camera is used to take photographs of the back of your eye without having to dilate them, or you can see an eye doctor for a full dilated exam.    Although you are still overdue for this important screening, due to the COVID-19 pandemic, we recommend scheduling it in the near future.    A1c every 3-6 months, lipid panel every year, microalbumin (urine test) once per year, comprehensive foot exam once per year, dilated eye exam at least once per year, dental exam every 6 months, flu vaccination every year, and pneumonia vaccination(s) as recommended

## 2022-04-15 NOTE — TELEPHONE ENCOUNTER
Have sent out #30 of med.  Has upcoming appt.  Make sure she comes in and gets ordered hgb a1C prior to appt.  
Spoke to pt. She will go in to [facility] to have lab.   
36.8

## 2022-04-16 NOTE — PROGRESS NOTES
Your mammogram only showed dense breast tissue and this means you will need to have a yearly mammogram.   Please contact me if you have any additional concerns.    Sincerely,    Oseas Cody MD

## 2022-05-17 ENCOUNTER — PATIENT MESSAGE (OUTPATIENT)
Dept: GASTROENTEROLOGY | Facility: CLINIC | Age: 77
End: 2022-05-17
Payer: MEDICARE

## 2022-07-05 ENCOUNTER — PES CALL (OUTPATIENT)
Dept: ADMINISTRATIVE | Facility: CLINIC | Age: 77
End: 2022-07-05
Payer: MEDICARE

## 2022-07-11 ENCOUNTER — TELEPHONE (OUTPATIENT)
Dept: FAMILY MEDICINE | Facility: CLINIC | Age: 77
End: 2022-07-11
Payer: MEDICARE

## 2022-07-11 NOTE — TELEPHONE ENCOUNTER
Attempted to reach patient in regards to rescheduling her 08/01 appointment. Left message instructing patient to call the clinic at her earliest convenience.

## 2022-09-12 DIAGNOSIS — N39.46 MIXED INCONTINENCE: Primary | ICD-10-CM

## 2022-09-12 DIAGNOSIS — R39.15 URGENCY OF MICTURITION: ICD-10-CM

## 2022-09-21 ENCOUNTER — CLINICAL SUPPORT (OUTPATIENT)
Dept: REHABILITATION | Facility: HOSPITAL | Age: 77
End: 2022-09-21
Payer: MEDICARE

## 2022-09-21 DIAGNOSIS — M62.81 MUSCLE WEAKNESS: ICD-10-CM

## 2022-09-21 DIAGNOSIS — M62.89 PELVIC FLOOR DYSFUNCTION: ICD-10-CM

## 2022-09-21 PROCEDURE — 97162 PT EVAL MOD COMPLEX 30 MIN: CPT | Mod: PN

## 2022-09-21 PROCEDURE — 97110 THERAPEUTIC EXERCISES: CPT | Mod: PN

## 2022-09-27 PROBLEM — M62.89 PELVIC FLOOR DYSFUNCTION: Status: ACTIVE | Noted: 2022-09-27

## 2022-09-27 PROBLEM — M62.81 MUSCLE WEAKNESS: Status: ACTIVE | Noted: 2022-09-27

## 2022-09-27 NOTE — PLAN OF CARE
"  [facility]  Pelvic Health Physical Therapy Initial Evaluation    Date: 2022   Name: Lulu Eden  Clinic Number: [id number]  Therapy Diagnosis:   Encounter Diagnoses   Name Primary?    Pelvic floor dysfunction     Muscle weakness      Physician: Janet Day MD    Physician Orders: PT Eval and Treat   Medical Diagnosis from Referral: Mixed incontinence [N39.46], Urgency of micturition [R39.15]  Evaluation Date: 2022  Authorization Period Expiration: 2022  Plan of Care Expiration: 2023  Visit # / Visits authorized: 1/ 15    Time In: 10:45  Time Out: 11:30  Total Appointment Time (timed & untimed codes): 45 minutes    Precautions: universal    Subjective     Date of onset: "very, very long time ago" - about     History of current condition - Lulu reports: saw a urologist years ago, even though she weighted a lot less than she does now but he told her to go home and lose weight and then come back. She had a hysterectomy and was told she also had a bladder lift, but her current doctor told her she didn't. It's "getting worse and worse."     OB/GYN History: , vaginal delivery, and episiotomy      Pain:  None     Bladder/Bowel History:   Frequency of urination:   Daytime: "pretty often" - when she stands up after sitting for a longer period, her bladder will empty wether she feels she has to go or not. Does not always has the urge to urinate.            Nighttime: once/night   Difficulty initiating urine stream: "No, I have trouble getting my panties down in time"   Urine stream: mildly weak   Complete emptying: Yes  Bladder leakage: Yes  Frequency of incidents: "constant" - changes her pad 4x/day   Amount leaked (urine): teaspoon(s) and full emptying - vaires   Urinary Urgency: Yes, the time she goes from the kitchen to the bathroom, walking fast  Frequency of bowel movements: once a day at least   Difficulty initiating BM: No  Quality/Shape of BM: soft and easy to " "pass, sometimes more formed than others   Does Patient Feel Empty after BM? Yes  Fiber Supplements or Laxative Use? Yes, takes Metamucil in the mornings (sometimes forgets), was told to take it for her type 2 DM   Colon leakage: can't recall, if so it doesn't happen often   Form of protection: pad  Number of pads required in 24 hours: about 4/day        Medical History: Lulu  has a past medical history of ALLERGIC RHINITIS, Benign hypertension, Diabetes mellitus, Diabetes mellitus type II, History of colonic polyps, Hyperlipidemia, Obstructive sleep apnea, Skin cancer (2000?), Skin cancer of nose (2002), and Type 2 diabetes mellitus, uncontrolled (7/11/2012).     Surgical History: Lulu Eden  has a past surgical history that includes TONSILLECTOMY, ADENOIDECTOMY, BILATERAL yringotomy and tubes (age 2); Total vaginal hysterectomy (1977); Knee Arthroplasty (2010); Hysterectomy; Total knee arthroplasty; and Colonoscopy (N/A, 2/11/2016).    Medications: Lulu has a current medication list which includes the following prescription(s): aspirin, atenolol, blood sugar diagnostic, blood-glucose meter, epinephrine, estradiol, fexofenadine-pseudoephedrine  mg, fluconazole, glimepiride, janumet, jardiance, lancets, liothyronine, multivitamin-minerals-lutein, and synthroid.    Allergies:   Review of patient's allergies indicates:   Allergen Reactions    Codeine Other (See Comments)     Other reaction(s): Stomach upset  Other reaction(s): Stomach upset    Iodine Other (See Comments)     Other reaction(s): ear pain  Other reaction(s): ear pain    Pseudoephedrine Other (See Comments)    Pseudoephedrine hcl Other (See Comments)     Other reaction(s): Hallucinations          Prior Therapy/Previous treatment included: none   Social History:  lives with their spouse  Current exercise: walks 15 min every day on the treadmill in the gym ("I read that when you get old that's all you need to do")   Occupation: retired "   Prior Level of Function: IND  Current Level of Function: see above     Types of fluid intake: at least 4 16-oz bottles/day on a good day (sometimes doesn't get as many).   Diet: to be assessed        Pts goals: resolve leakage     OBJECTIVE       Hip Strength:  Hip Left Right   Flexion 4+/5 4+/5   Abduction 3/5 3/5   Adduction  3/5 3/5   External Rot 3+/5 3+/5   Internal Rot 4/5 4/5       BREATHING MECHANICS ASSESSMENT   Deferred due to time     VAGINAL PELVIC FLOOR EXAM - Deferred due to time    Limitation/Restriction for FOTO Pelvic Urinary Problem Survey    Therapist reviewed FOTO scores for Lulu Eden on 9/21/2022.   FOTO documents entered into [employer] - see Media section.    Limitation Score: 59%       TREATMENT     Treatment Time In: 11:00  Treatment Time Out: 11:30  Total Treatment time (time-based codes) separate from Evaluation: 30 minutes      Therapeutic Exercise to develop strength, endurance, and core stabilization for 30 minutes including:  overflow exercises   - supine clamshells   - bridges   - hip adduction ball squeeze       Patient Education provided:   general anatomy/physiology of urinary/ bowel  system and benefits of treatment were discussed with the pt. Additionally, anatomy/physiology of pelvic floor and overflow exercises were reviewed.     Home Exercises provided:  Written Home Exercises provided: yes.  Exercises were reviewed and Lulu was able to demonstrate them prior to the end of the session.    Lulu demonstrated good  understanding of the education provided.     See EMR under Patient Instructions for exercises provided 9/21/2022.    Assessment     Lulu is a 77 y.o. female referred to outpatient Physical Therapy with a medical diagnosis of Mixed incontinence [N39.46], Urgency of micturition [R39.15]. Pt presents with poor knowledge of body mechanics and posture, poor trunk stability, decreased endurance of the pelvic muscles, poor quality of pelvic muscle  contraction, increased frequency of urination, increased nocturia, poor coordination of pelvic floor muscles during ADL's leading to urinary or fecal leakage, poor fluid intake, dysfunctional voiding, dysfunctional defecation, unable to co-contract or co-relax abdominal wall and pelvic floor muscles, and hip muscle weakness.      Pt prognosis is Good.   Pt will benefit from skilled outpatient Physical Therapy to address the deficits stated above and in the chart below, provide pt/family education, and to maximize pt's level of independence.     Plan of care discussed with patient: Yes  Pt's spiritual, cultural and educational needs considered and patient is agreeable to the plan of care and goals as stated below:     Anticipated Barriers for therapy: chronicity of condition       Goals:  Short Term Goals: 6 weeks   - Pt will demonstrate excellent knowledge and adherence to HEP to facilitate optimal recovery.  - Pt will demonstrate proper PFM contraction, relaxation, and lengthening coordinated with TA and breath for improved muscle coordination needed for functional activity.    Long Term Goals: 12-16 weeks   - Pt will demonstrate excellent knowledge and adherence to HEP for continued self-maintenance of symptoms.  - Pt will report FOTO score of 47% limited or less indicating clinically relevant increase in function.  - Pt will report voiding interval of 2-3 hours for improved ADL tolerance.  - Pt will report ability to delay urinary urge for at least 15 minutes to maintain continence with ADL/IADLs.   - Pt will report little (drops) to no incidence of urinary incontinence 6/7 days for improved hygiene and ADL/IADL tolerance.   - Pt will report needing </= 2 pad/day indicating improved PFM function needed to maintain continence  - Pt will demonstrate PFM strength of at least 3/5 MMT for improved strength needed to maintain continence.  - Pt will demonstrate hip muscle strength improvement of at least 1 MMT  grade  for improved hip and pelvic stability.   - Pt will report bearing down appropriately 100% of the time for improved bowel function and decreased stress on adjacent pelvic structures.         Plan     Plan of care Certification: 9/21/2022 to 1/21/2023.    Outpatient Physical Therapy 1 times weekly for 16 weeks to include the following interventions: therapeutic exercises, therapeutic activity, neuromuscular re-education, manual therapy, modalities PRN, patient/family education, dry needling, and self care/home management    Venita Randle, PT

## 2022-11-11 ENCOUNTER — PES CALL (OUTPATIENT)
Dept: ADMINISTRATIVE | Facility: CLINIC | Age: 77
End: 2022-11-11
Payer: MEDICARE

## 2022-11-26 LAB — HBA1C MFR BLD: 8.1 %

## 2023-01-10 ENCOUNTER — OFFICE VISIT (OUTPATIENT)
Dept: FAMILY MEDICINE | Facility: CLINIC | Age: 78
End: 2023-01-10
Payer: MEDICARE

## 2023-01-10 VITALS
DIASTOLIC BLOOD PRESSURE: 64 MMHG | WEIGHT: 160.69 LBS | OXYGEN SATURATION: 95 % | BODY MASS INDEX: 27.43 KG/M2 | RESPIRATION RATE: 18 BRPM | SYSTOLIC BLOOD PRESSURE: 110 MMHG | HEART RATE: 95 BPM | HEIGHT: 64 IN

## 2023-01-10 DIAGNOSIS — B35.1 ONYCHOMYCOSIS: Primary | ICD-10-CM

## 2023-01-10 PROCEDURE — 1159F MED LIST DOCD IN RCRD: CPT | Mod: HCNC,CPTII,S$GLB, | Performed by: STUDENT IN AN ORGANIZED HEALTH CARE EDUCATION/TRAINING PROGRAM

## 2023-01-10 PROCEDURE — 1101F PR PT FALLS ASSESS DOC 0-1 FALLS W/OUT INJ PAST YR: ICD-10-PCS | Mod: HCNC,CPTII,S$GLB, | Performed by: STUDENT IN AN ORGANIZED HEALTH CARE EDUCATION/TRAINING PROGRAM

## 2023-01-10 PROCEDURE — 99213 PR OFFICE/OUTPT VISIT, EST, LEVL III, 20-29 MIN: ICD-10-PCS | Mod: HCNC,S$GLB,, | Performed by: STUDENT IN AN ORGANIZED HEALTH CARE EDUCATION/TRAINING PROGRAM

## 2023-01-10 PROCEDURE — 3074F SYST BP LT 130 MM HG: CPT | Mod: HCNC,CPTII,S$GLB, | Performed by: STUDENT IN AN ORGANIZED HEALTH CARE EDUCATION/TRAINING PROGRAM

## 2023-01-10 PROCEDURE — 1125F AMNT PAIN NOTED PAIN PRSNT: CPT | Mod: HCNC,CPTII,S$GLB, | Performed by: STUDENT IN AN ORGANIZED HEALTH CARE EDUCATION/TRAINING PROGRAM

## 2023-01-10 PROCEDURE — 99999 PR PBB SHADOW E&M-EST. PATIENT-LVL IV: ICD-10-PCS | Mod: PBBFAC,HCNC,, | Performed by: STUDENT IN AN ORGANIZED HEALTH CARE EDUCATION/TRAINING PROGRAM

## 2023-01-10 PROCEDURE — 1160F PR REVIEW ALL MEDS BY PRESCRIBER/CLIN PHARMACIST DOCUMENTED: ICD-10-PCS | Mod: HCNC,CPTII,S$GLB, | Performed by: STUDENT IN AN ORGANIZED HEALTH CARE EDUCATION/TRAINING PROGRAM

## 2023-01-10 PROCEDURE — 3078F PR MOST RECENT DIASTOLIC BLOOD PRESSURE < 80 MM HG: ICD-10-PCS | Mod: HCNC,CPTII,S$GLB, | Performed by: STUDENT IN AN ORGANIZED HEALTH CARE EDUCATION/TRAINING PROGRAM

## 2023-01-10 PROCEDURE — 1160F RVW MEDS BY RX/DR IN RCRD: CPT | Mod: HCNC,CPTII,S$GLB, | Performed by: STUDENT IN AN ORGANIZED HEALTH CARE EDUCATION/TRAINING PROGRAM

## 2023-01-10 PROCEDURE — 1101F PT FALLS ASSESS-DOCD LE1/YR: CPT | Mod: HCNC,CPTII,S$GLB, | Performed by: STUDENT IN AN ORGANIZED HEALTH CARE EDUCATION/TRAINING PROGRAM

## 2023-01-10 PROCEDURE — 3074F PR MOST RECENT SYSTOLIC BLOOD PRESSURE < 130 MM HG: ICD-10-PCS | Mod: HCNC,CPTII,S$GLB, | Performed by: STUDENT IN AN ORGANIZED HEALTH CARE EDUCATION/TRAINING PROGRAM

## 2023-01-10 PROCEDURE — 99213 OFFICE O/P EST LOW 20 MIN: CPT | Mod: HCNC,S$GLB,, | Performed by: STUDENT IN AN ORGANIZED HEALTH CARE EDUCATION/TRAINING PROGRAM

## 2023-01-10 PROCEDURE — 3288F FALL RISK ASSESSMENT DOCD: CPT | Mod: HCNC,CPTII,S$GLB, | Performed by: STUDENT IN AN ORGANIZED HEALTH CARE EDUCATION/TRAINING PROGRAM

## 2023-01-10 PROCEDURE — 3078F DIAST BP <80 MM HG: CPT | Mod: HCNC,CPTII,S$GLB, | Performed by: STUDENT IN AN ORGANIZED HEALTH CARE EDUCATION/TRAINING PROGRAM

## 2023-01-10 PROCEDURE — 1159F PR MEDICATION LIST DOCUMENTED IN MEDICAL RECORD: ICD-10-PCS | Mod: HCNC,CPTII,S$GLB, | Performed by: STUDENT IN AN ORGANIZED HEALTH CARE EDUCATION/TRAINING PROGRAM

## 2023-01-10 PROCEDURE — 1125F PR PAIN SEVERITY QUANTIFIED, PAIN PRESENT: ICD-10-PCS | Mod: HCNC,CPTII,S$GLB, | Performed by: STUDENT IN AN ORGANIZED HEALTH CARE EDUCATION/TRAINING PROGRAM

## 2023-01-10 PROCEDURE — 3288F PR FALLS RISK ASSESSMENT DOCUMENTED: ICD-10-PCS | Mod: HCNC,CPTII,S$GLB, | Performed by: STUDENT IN AN ORGANIZED HEALTH CARE EDUCATION/TRAINING PROGRAM

## 2023-01-10 PROCEDURE — 99999 PR PBB SHADOW E&M-EST. PATIENT-LVL IV: CPT | Mod: PBBFAC,HCNC,, | Performed by: STUDENT IN AN ORGANIZED HEALTH CARE EDUCATION/TRAINING PROGRAM

## 2023-01-10 RX ORDER — MIRABEGRON 50 MG/1
TABLET, FILM COATED, EXTENDED RELEASE ORAL
COMMUNITY
Start: 2022-08-29

## 2023-01-10 RX ORDER — CICLOPIROX 7.7 MG/G
GEL TOPICAL 2 TIMES DAILY PRN
Qty: 100 G | Refills: 0 | Status: SHIPPED | OUTPATIENT
Start: 2023-01-10 | End: 2023-02-21

## 2023-01-10 RX ORDER — CICLOPIROX 7.7 MG/G
GEL TOPICAL 2 TIMES DAILY PRN
Qty: 100 G | Refills: 0 | Status: SHIPPED | OUTPATIENT
Start: 2023-01-10 | End: 2023-01-10

## 2023-01-10 NOTE — PROGRESS NOTES
"[facility] CLINIC NOTE    Patient Name: Lulu Eden  YOB: 1945    PRESENTING HISTORY     History of Present Illness:  Ms. Lulu Eden is a 77 y.o. female here for UC visit.     2 weeks of right 4th finger nail changes and mild pain.   No fever.   No other affected fingers.   No trauma.       ROS      OBJECTIVE:   Vital Signs:  Vitals:    01/10/23 1431   BP: 110/64   Pulse: 95   Resp: 18   SpO2: 95%   Weight: 72.9 kg (160 lb 11.5 oz)   Height: 5' 4" (1.626 m)            Physical Exam  Vitals and nursing note reviewed.   Constitutional:       General: She is not in acute distress.     Appearance: She is not toxic-appearing or diaphoretic.   HENT:      Head: Normocephalic and atraumatic.      Right Ear: External ear normal.      Left Ear: External ear normal.   Eyes:      General: No scleral icterus.     Conjunctiva/sclera: Conjunctivae normal.      Pupils: Pupils are equal, round, and reactive to light.   Neck:      Thyroid: No thyromegaly.   Cardiovascular:      Rate and Rhythm: Normal rate and regular rhythm.      Heart sounds: Normal heart sounds. No murmur heard.  Pulmonary:      Effort: Pulmonary effort is normal. No respiratory distress.      Breath sounds: Normal breath sounds. No wheezing or rales.   Musculoskeletal:      Cervical back: Normal range of motion and neck supple.   Lymphadenopathy:      Cervical: No cervical adenopathy.   Skin:     Comments: Ring finger of right hand- distal 1/2 of nail is normal. Nail growing out is thickened and cloudy with irregular texture.   No surrounding erythema or fluctuance.    Neurological:      Mental Status: She is alert and oriented to person, place, and time.      Gait: Gait is intact.   Psychiatric:         Mood and Affect: Mood and affect normal.         Cognition and Memory: Memory normal.         Judgment: Judgment normal.       ASSESSMENT & PLAN:     Onychomycosis  -     Discontinue: ciclopirox 0.77 % Gel; Apply topically " 2 (two) times daily as needed.  Dispense: 100 g; Refill: 0  -     ciclopirox 0.77 % Gel; Apply topically 2 (two) times daily as needed.  Dispense: 100 g; Refill: 0     I think this is a fungal nail infection. Treat with topical therapy. If fails will probably refer to derm rather than escalate as I am not completely confident in diagnosis.     Pertinent comorbidity is DM2, follows with outside endo. Permissive hyperglycemia but at goal.     Jb Winslow MD   Internal Medicine

## 2023-01-11 ENCOUNTER — TELEPHONE (OUTPATIENT)
Dept: FAMILY MEDICINE | Facility: CLINIC | Age: 78
End: 2023-01-11
Payer: MEDICARE

## 2023-01-17 ENCOUNTER — PATIENT OUTREACH (OUTPATIENT)
Dept: ADMINISTRATIVE | Facility: HOSPITAL | Age: 78
End: 2023-01-17
Payer: MEDICARE

## 2023-01-17 NOTE — LETTER
AUTHORIZATION FOR RELEASE OF   CONFIDENTIAL INFORMATION    Dear Dr Zacarias,    We are seeing Lulu Eden, date of birth 1945, in the clinic at [facility]. Jb Winslow MD is the patient's PCP. Lulu Eden has an outstanding lab/procedure at the time we reviewed her chart. In order to help keep her health information updated, she has authorized us to request the following medical record(s):        (  )  MAMMOGRAM                                      (  )  COLONOSCOPY      (  )  PAP SMEAR                                          (  )  OUTSIDE LAB RESULTS     (  )  DEXA SCAN                                          (  )  EYE EXAM            (  )  FOOT EXAM                                          (  )  ENTIRE RECORD     (  )  OUTSIDE IMMUNIZATIONS                 ( X )Diabetes records,office note, tests         Please fax records to [facility], Craig P Naccari, MD at [phone]    Thanks so much and have a great day!    Pamela Hernandez LPN [facility]  [address] [place] [address]  P- [phone]  F [phone]          Patient Name: Lulu Eden  : 1945  Patient Phone #: [phone]

## 2023-01-19 ENCOUNTER — TELEPHONE (OUTPATIENT)
Dept: FAMILY MEDICINE | Facility: CLINIC | Age: 78
End: 2023-01-19
Payer: MEDICARE

## 2023-01-19 DIAGNOSIS — B35.1 ONYCHOMYCOSIS: Primary | ICD-10-CM

## 2023-01-19 NOTE — TELEPHONE ENCOUNTER
Call placed to patient for notification. Patient verbalized understanding.       Jb Winslow MD  You 8 minutes ago (11:44 AM)     I sent something else.

## 2023-01-19 NOTE — TELEPHONE ENCOUNTER
----- Message from Kierra Bragg MA sent at 1/19/2023 10:57 AM CST -----  Pt was seen on 1/10 and was given an rx for nail fungus but it cost 100 dollars and she cannot afford that...[facility]  Please call her @ [phone]  Thanks !  Please do NOT respond directly back to me, any questions, reply to staff box since this inbox is not routinely monitored

## 2023-01-25 ENCOUNTER — TELEPHONE (OUTPATIENT)
Dept: FAMILY MEDICINE | Facility: CLINIC | Age: 78
End: 2023-01-25
Payer: MEDICARE

## 2023-01-25 DIAGNOSIS — Z12.31 ENCOUNTER FOR SCREENING MAMMOGRAM FOR MALIGNANT NEOPLASM OF BREAST: Primary | ICD-10-CM

## 2023-01-25 NOTE — TELEPHONE ENCOUNTER
Orders placed, I spoke with pt via phone. Last mammo done on 03/22/2022.   Please see appointment encounter for appt date and time.   ----- Message from Tamia Rivera sent at 1/25/2023 10:32 AM CST -----  Type: Needs Medical Advice  Who Called:  pt     Best Call Back Number: [phone] (home)     Additional Information: pt would like order to get a Mammo done. Please call the pt once its sent .. please advise thank you

## 2023-02-09 DIAGNOSIS — Z00.00 ENCOUNTER FOR MEDICARE ANNUAL WELLNESS EXAM: ICD-10-CM

## 2023-02-13 NOTE — TELEPHONE ENCOUNTER
Find out if pt. Is taking 25 mg or 50 mg atenolol.  
Pt states that she takes 25mg in the morning and 50mg in evening.  
EUS

## 2023-03-14 ENCOUNTER — OFFICE VISIT (OUTPATIENT)
Dept: DERMATOLOGY | Facility: CLINIC | Age: 78
End: 2023-03-14
Payer: MEDICARE

## 2023-03-14 ENCOUNTER — LAB VISIT (OUTPATIENT)
Dept: LAB | Facility: HOSPITAL | Age: 78
End: 2023-03-14
Payer: MEDICARE

## 2023-03-14 VITALS — WEIGHT: 160.69 LBS | HEIGHT: 64 IN | BODY MASS INDEX: 27.43 KG/M2

## 2023-03-14 DIAGNOSIS — B35.1 ONYCHOMYCOSIS: Primary | ICD-10-CM

## 2023-03-14 DIAGNOSIS — B35.1 ONYCHOMYCOSIS: ICD-10-CM

## 2023-03-14 LAB
ALBUMIN SERPL BCP-MCNC: 4.1 G/DL (ref 3.5–5.2)
ALP SERPL-CCNC: 92 U/L (ref 55–135)
ALT SERPL W/O P-5'-P-CCNC: 20 U/L (ref 10–44)
ANION GAP SERPL CALC-SCNC: 12 MMOL/L (ref 8–16)
AST SERPL-CCNC: 13 U/L (ref 10–40)
BILIRUB SERPL-MCNC: 0.7 MG/DL (ref 0.1–1)
BUN SERPL-MCNC: 16 MG/DL (ref 8–23)
CALCIUM SERPL-MCNC: 10 MG/DL (ref 8.7–10.5)
CHLORIDE SERPL-SCNC: 103 MMOL/L (ref 95–110)
CO2 SERPL-SCNC: 23 MMOL/L (ref 23–29)
CREAT SERPL-MCNC: 0.8 MG/DL (ref 0.5–1.4)
EST. GFR  (NO RACE VARIABLE): >60 ML/MIN/1.73 M^2
GLUCOSE SERPL-MCNC: 237 MG/DL (ref 70–110)
POTASSIUM SERPL-SCNC: 4.5 MMOL/L (ref 3.5–5.1)
PROT SERPL-MCNC: 6.6 G/DL (ref 6–8.4)
SODIUM SERPL-SCNC: 138 MMOL/L (ref 136–145)

## 2023-03-14 PROCEDURE — 1159F PR MEDICATION LIST DOCUMENTED IN MEDICAL RECORD: ICD-10-PCS | Mod: HCNC,CPTII,S$GLB, | Performed by: STUDENT IN AN ORGANIZED HEALTH CARE EDUCATION/TRAINING PROGRAM

## 2023-03-14 PROCEDURE — 1126F AMNT PAIN NOTED NONE PRSNT: CPT | Mod: HCNC,CPTII,S$GLB, | Performed by: STUDENT IN AN ORGANIZED HEALTH CARE EDUCATION/TRAINING PROGRAM

## 2023-03-14 PROCEDURE — 99999 PR PBB SHADOW E&M-EST. PATIENT-LVL II: CPT | Mod: PBBFAC,HCNC,, | Performed by: STUDENT IN AN ORGANIZED HEALTH CARE EDUCATION/TRAINING PROGRAM

## 2023-03-14 PROCEDURE — 80053 COMPREHEN METABOLIC PANEL: CPT | Mod: HCNC | Performed by: STUDENT IN AN ORGANIZED HEALTH CARE EDUCATION/TRAINING PROGRAM

## 2023-03-14 PROCEDURE — 1160F RVW MEDS BY RX/DR IN RCRD: CPT | Mod: HCNC,CPTII,S$GLB, | Performed by: STUDENT IN AN ORGANIZED HEALTH CARE EDUCATION/TRAINING PROGRAM

## 2023-03-14 PROCEDURE — 99214 PR OFFICE/OUTPT VISIT, EST, LEVL IV, 30-39 MIN: ICD-10-PCS | Mod: HCNC,S$GLB,, | Performed by: STUDENT IN AN ORGANIZED HEALTH CARE EDUCATION/TRAINING PROGRAM

## 2023-03-14 PROCEDURE — 1126F PR PAIN SEVERITY QUANTIFIED, NO PAIN PRESENT: ICD-10-PCS | Mod: HCNC,CPTII,S$GLB, | Performed by: STUDENT IN AN ORGANIZED HEALTH CARE EDUCATION/TRAINING PROGRAM

## 2023-03-14 PROCEDURE — 36415 COLL VENOUS BLD VENIPUNCTURE: CPT | Mod: HCNC,PO | Performed by: STUDENT IN AN ORGANIZED HEALTH CARE EDUCATION/TRAINING PROGRAM

## 2023-03-14 PROCEDURE — 1159F MED LIST DOCD IN RCRD: CPT | Mod: HCNC,CPTII,S$GLB, | Performed by: STUDENT IN AN ORGANIZED HEALTH CARE EDUCATION/TRAINING PROGRAM

## 2023-03-14 PROCEDURE — 99999 PR PBB SHADOW E&M-EST. PATIENT-LVL II: ICD-10-PCS | Mod: PBBFAC,HCNC,, | Performed by: STUDENT IN AN ORGANIZED HEALTH CARE EDUCATION/TRAINING PROGRAM

## 2023-03-14 PROCEDURE — 1160F PR REVIEW ALL MEDS BY PRESCRIBER/CLIN PHARMACIST DOCUMENTED: ICD-10-PCS | Mod: HCNC,CPTII,S$GLB, | Performed by: STUDENT IN AN ORGANIZED HEALTH CARE EDUCATION/TRAINING PROGRAM

## 2023-03-14 PROCEDURE — 99214 OFFICE O/P EST MOD 30 MIN: CPT | Mod: HCNC,S$GLB,, | Performed by: STUDENT IN AN ORGANIZED HEALTH CARE EDUCATION/TRAINING PROGRAM

## 2023-03-14 PROCEDURE — 87101 SKIN FUNGI CULTURE: CPT | Mod: HCNC | Performed by: STUDENT IN AN ORGANIZED HEALTH CARE EDUCATION/TRAINING PROGRAM

## 2023-03-14 RX ORDER — EMPAGLIFLOZIN 25 MG/1
TABLET, FILM COATED ORAL
COMMUNITY
Start: 2023-03-06

## 2023-03-14 RX ORDER — CICLOPIROX 80 MG/ML
SOLUTION TOPICAL NIGHTLY
Qty: 6.6 ML | Refills: 2 | Status: SHIPPED | OUTPATIENT
Start: 2023-03-14

## 2023-03-14 NOTE — PROGRESS NOTES
Subjective:       Patient ID:  Lulu Eden is a 77 y.o. female who presents for   Chief Complaint   Patient presents with    Nail Problem     Last OV 9-   Seborrheic keratoses, inflamed      Patient here today with c/o a problem with the right hand ring finger for several months, nail is not growing, has tried an OTC product that was recommended by the pharmacist and Vicks, no relief    She was prescribed julbia and cicloprix but both were too expensive.   Denies getting manicures, does not get rashes on right hand or finger  Denies picking at her cuticles  Possible hx of trauma  No hx of psoriasis.      Derm Hx:  - right nasal ala - had mohs- she is unsure what type of skin cancer.     She does not drink alcohol.   No hx of hepatitis.       Review of Systems   Constitutional:  Negative for fever and chills.   Respiratory:  Negative for cough and shortness of breath.    Gastrointestinal:  Negative for nausea and vomiting.   Skin:  Positive for daily sunscreen use (on face), activity-related sunscreen use and wears hat.   Hematologic/Lymphatic: Bruises/bleeds easily (asa).      Objective:    Physical Exam   Constitutional: She appears well-developed and well-nourished. No distress.   Neurological: She is alert and oriented to person, place, and time. She is not disoriented.   Psychiatric: She has a normal mood and affect.   Skin:   Areas Examined (abnormalities noted in diagram):   JG Inspected           Diagram Legend     Erythematous scaling macule/papule c/w actinic keratosis       Vascular papule c/w angioma      Pigmented verrucoid papule/plaque c/w seborrheic keratosis      Yellow umbilicated papule c/w sebaceous hyperplasia      Irregularly shaped tan macule c/w lentigo     1-2 mm smooth white papules consistent with Milia      Movable subcutaneous cyst with punctum c/w epidermal inclusion cyst      Subcutaneous movable cyst c/w pilar cyst      Firm pink to brown papule c/w dermatofibroma       Pedunculated fleshy papule(s) c/w skin tag(s)      Evenly pigmented macule c/w junctional nevus     Mildly variegated pigmented, slightly irregular-bordered macule c/w mildly atypical nevus      Flesh colored to evenly pigmented papule c/w intradermal nevus       Pink pearly papule/plaque c/w basal cell carcinoma      Erythematous hyperkeratotic cursted plaque c/w SCC      Surgical scar with no sign of skin cancer recurrence      Open and closed comedones      Inflammatory papules and pustules      Verrucoid papule consistent consistent with wart     Erythematous eczematous patches and plaques     Dystrophic onycholytic nail with subungual debris c/w onychomycosis     Umbilicated papule    Erythematous-base heme-crusted tan verrucoid plaque consistent with inflamed seborrheic keratosis     Erythematous Silvery Scaling Plaque c/w Psoriasis     See annotation        Assessment / Plan:        Onychomycosis  -     ciclopirox (PENLAC) 8 % Soln; Apply topically nightly.  Dispense: 6.6 mL; Refill: 2  -     Comprehensive Metabolic Panel; Future; Expected date: 03/14/2023  Also given sample of jublia  - discussed risks and benefits of lamisil PO  We discussed using Lamisil for onychomycosis. This drug offers a fairly high but not universal cure rate. A 12 week treatment course is recommended. The patient is aware that rare cases of liver injury have been reported; and agrees to come in for liver function tests at 6 weeks of treatment. The symptoms of liver disease have been discussed; call if such occurs. In addition, some insurance plans do not cover the expense of this drug for treating a cosmetic condition, and the patient understands they may have to pay for the medication. Other side effects, such as headaches and rashes, have also been discussed.  Fungal clipping sent  Recommend vinegar soaks            No follow-ups on file.

## 2023-03-27 ENCOUNTER — TELEPHONE (OUTPATIENT)
Dept: DERMATOLOGY | Facility: CLINIC | Age: 78
End: 2023-03-27
Payer: MEDICARE

## 2023-03-27 ENCOUNTER — HOSPITAL ENCOUNTER (OUTPATIENT)
Dept: RADIOLOGY | Facility: CLINIC | Age: 78
Discharge: HOME | End: 2023-03-27
Attending: STUDENT IN AN ORGANIZED HEALTH CARE EDUCATION/TRAINING PROGRAM
Payer: MEDICARE

## 2023-03-27 DIAGNOSIS — Z12.31 ENCOUNTER FOR SCREENING MAMMOGRAM FOR MALIGNANT NEOPLASM OF BREAST: ICD-10-CM

## 2023-03-27 PROCEDURE — 77067 SCR MAMMO BI INCL CAD: CPT | Mod: 26,HCNC,, | Performed by: RADIOLOGY

## 2023-03-27 PROCEDURE — 77063 BREAST TOMOSYNTHESIS BI: CPT | Mod: 26,HCNC,, | Performed by: RADIOLOGY

## 2023-03-27 PROCEDURE — 77067 SCR MAMMO BI INCL CAD: CPT | Mod: TC,HCNC,PO

## 2023-03-27 PROCEDURE — 77063 MAMMO DIGITAL SCREENING BILAT WITH TOMO: ICD-10-PCS | Mod: 26,HCNC,, | Performed by: RADIOLOGY

## 2023-03-27 PROCEDURE — 77067 MAMMO DIGITAL SCREENING BILAT WITH TOMO: ICD-10-PCS | Mod: 26,HCNC,, | Performed by: RADIOLOGY

## 2023-03-27 NOTE — TELEPHONE ENCOUNTER
----- Message from Codie Hobbs MD sent at 3/26/2023  1:57 PM CDT -----  Please let patient know her blood sugar was very high at time of blood draw. Please monitor at home.   Her liver and kidney function look good.   We are still waiting on her fungal culture results before deciding on a medication.

## 2023-03-27 NOTE — TELEPHONE ENCOUNTER
Contacted pt in regards to culture and labs.Pt voices understanding. She is aware she should monitor her sugar levels at home.

## 2023-04-05 LAB — HBA1C MFR BLD: 8.2 %

## 2023-04-19 LAB — FUNGUS BLD CULT: NORMAL

## 2023-04-25 ENCOUNTER — OFFICE VISIT (OUTPATIENT)
Dept: DERMATOLOGY | Facility: CLINIC | Age: 78
End: 2023-04-25
Payer: MEDICARE

## 2023-04-25 VITALS — HEIGHT: 64 IN | BODY MASS INDEX: 27.31 KG/M2 | WEIGHT: 160 LBS

## 2023-04-25 DIAGNOSIS — L60.3 NAIL DYSTROPHY: Primary | ICD-10-CM

## 2023-04-25 PROCEDURE — 88312 PR  SPECIAL STAINS,GROUP I: ICD-10-PCS | Mod: 26,HCNC,, | Performed by: PATHOLOGY

## 2023-04-25 PROCEDURE — 88312 SPECIAL STAINS GROUP 1: CPT | Mod: 26,HCNC,, | Performed by: PATHOLOGY

## 2023-04-25 PROCEDURE — 99213 OFFICE O/P EST LOW 20 MIN: CPT | Mod: HCNC,S$GLB,, | Performed by: STUDENT IN AN ORGANIZED HEALTH CARE EDUCATION/TRAINING PROGRAM

## 2023-04-25 PROCEDURE — 1160F RVW MEDS BY RX/DR IN RCRD: CPT | Mod: HCNC,CPTII,S$GLB, | Performed by: STUDENT IN AN ORGANIZED HEALTH CARE EDUCATION/TRAINING PROGRAM

## 2023-04-25 PROCEDURE — 1160F PR REVIEW ALL MEDS BY PRESCRIBER/CLIN PHARMACIST DOCUMENTED: ICD-10-PCS | Mod: HCNC,CPTII,S$GLB, | Performed by: STUDENT IN AN ORGANIZED HEALTH CARE EDUCATION/TRAINING PROGRAM

## 2023-04-25 PROCEDURE — 99999 PR PBB SHADOW E&M-EST. PATIENT-LVL III: ICD-10-PCS | Mod: PBBFAC,HCNC,, | Performed by: STUDENT IN AN ORGANIZED HEALTH CARE EDUCATION/TRAINING PROGRAM

## 2023-04-25 PROCEDURE — 99999 PR PBB SHADOW E&M-EST. PATIENT-LVL III: CPT | Mod: PBBFAC,HCNC,, | Performed by: STUDENT IN AN ORGANIZED HEALTH CARE EDUCATION/TRAINING PROGRAM

## 2023-04-25 PROCEDURE — 99213 PR OFFICE/OUTPT VISIT, EST, LEVL III, 20-29 MIN: ICD-10-PCS | Mod: HCNC,S$GLB,, | Performed by: STUDENT IN AN ORGANIZED HEALTH CARE EDUCATION/TRAINING PROGRAM

## 2023-04-25 PROCEDURE — 3288F PR FALLS RISK ASSESSMENT DOCUMENTED: ICD-10-PCS | Mod: HCNC,CPTII,S$GLB, | Performed by: STUDENT IN AN ORGANIZED HEALTH CARE EDUCATION/TRAINING PROGRAM

## 2023-04-25 PROCEDURE — 88304 TISSUE EXAM BY PATHOLOGIST: CPT | Mod: HCNC | Performed by: PATHOLOGY

## 2023-04-25 PROCEDURE — 88304 PR  SURG PATH,LEVEL III: ICD-10-PCS | Mod: 26,HCNC,, | Performed by: PATHOLOGY

## 2023-04-25 PROCEDURE — 88304 TISSUE EXAM BY PATHOLOGIST: CPT | Mod: 26,HCNC,, | Performed by: PATHOLOGY

## 2023-04-25 PROCEDURE — 1159F MED LIST DOCD IN RCRD: CPT | Mod: HCNC,CPTII,S$GLB, | Performed by: STUDENT IN AN ORGANIZED HEALTH CARE EDUCATION/TRAINING PROGRAM

## 2023-04-25 PROCEDURE — 1101F PT FALLS ASSESS-DOCD LE1/YR: CPT | Mod: HCNC,CPTII,S$GLB, | Performed by: STUDENT IN AN ORGANIZED HEALTH CARE EDUCATION/TRAINING PROGRAM

## 2023-04-25 PROCEDURE — 1159F PR MEDICATION LIST DOCUMENTED IN MEDICAL RECORD: ICD-10-PCS | Mod: HCNC,CPTII,S$GLB, | Performed by: STUDENT IN AN ORGANIZED HEALTH CARE EDUCATION/TRAINING PROGRAM

## 2023-04-25 PROCEDURE — 1101F PR PT FALLS ASSESS DOC 0-1 FALLS W/OUT INJ PAST YR: ICD-10-PCS | Mod: HCNC,CPTII,S$GLB, | Performed by: STUDENT IN AN ORGANIZED HEALTH CARE EDUCATION/TRAINING PROGRAM

## 2023-04-25 PROCEDURE — 88312 SPECIAL STAINS GROUP 1: CPT | Mod: HCNC | Performed by: PATHOLOGY

## 2023-04-25 PROCEDURE — 3288F FALL RISK ASSESSMENT DOCD: CPT | Mod: HCNC,CPTII,S$GLB, | Performed by: STUDENT IN AN ORGANIZED HEALTH CARE EDUCATION/TRAINING PROGRAM

## 2023-04-25 NOTE — PROGRESS NOTES
Subjective:      Patient ID:  Lulu Eden is a 78 y.o. female who presents for   Chief Complaint   Patient presents with    Follow-up     Nail      LOV 3/14/23    Patient here today for F/U on nail problem  Pt using Ciclopirox as instructed, helps some.  Pt states she notices little improvement to area, no new concerns.    Derm Hx:  - right nasal ala - had mohs- she is unsure what type of skin cancer.      She does not drink alcohol.   No hx of hepatitis.          Review of Systems   Constitutional:  Negative for fever and chills.   Respiratory:  Negative for cough and shortness of breath.    Gastrointestinal:  Negative for nausea and vomiting.   Skin:  Positive for daily sunscreen use (on face), activity-related sunscreen use and wears hat.   Hematologic/Lymphatic: Bruises/bleeds easily (asa).     Objective:   Physical Exam   Constitutional: She appears well-developed and well-nourished. No distress.   Neurological: She is alert and oriented to person, place, and time. She is not disoriented.   Psychiatric: She has a normal mood and affect.   Skin:   Areas Examined (abnormalities noted in diagram):   RUE Inspected         Diagram Legend     Erythematous scaling macule/papule c/w actinic keratosis       Vascular papule c/w angioma      Pigmented verrucoid papule/plaque c/w seborrheic keratosis      Yellow umbilicated papule c/w sebaceous hyperplasia      Irregularly shaped tan macule c/w lentigo     1-2 mm smooth white papules consistent with Milia      Movable subcutaneous cyst with punctum c/w epidermal inclusion cyst      Subcutaneous movable cyst c/w pilar cyst      Firm pink to brown papule c/w dermatofibroma      Pedunculated fleshy papule(s) c/w skin tag(s)      Evenly pigmented macule c/w junctional nevus     Mildly variegated pigmented, slightly irregular-bordered macule c/w mildly atypical nevus      Flesh colored to evenly pigmented papule c/w intradermal nevus       Pink pearly papule/plaque c/w  basal cell carcinoma      Erythematous hyperkeratotic cursted plaque c/w SCC      Surgical scar with no sign of skin cancer recurrence      Open and closed comedones      Inflammatory papules and pustules      Verrucoid papule consistent consistent with wart     Erythematous eczematous patches and plaques     Dystrophic onycholytic nail with subungual debris c/w onychomycosis     Umbilicated papule    Erythematous-base heme-crusted tan verrucoid plaque consistent with inflamed seborrheic keratosis     Erythematous Silvery Scaling Plaque c/w Psoriasis     See annotation        Assessment / Plan:      Pathology Orders:       Normal Orders This Visit    Specimen to Pathology, Dermatology     Questions:    Procedure Type: Dermatology and skin neoplasms    Number of Specimens: 1    ------------------------: -------------------------    Spec 1 Procedure: Biopsy    Spec 1 Clinical Impression: please perform PAS/GMS to r/u fungal infection    Spec 1 Source: right 4th digit    Release to patient:           Nail dystrophy  -     Specimen to Pathology, Dermatology  Improving!  Culture negative  Will send specimen for PAS/GMS  Continue penlac topical  Will send PO antifungal if +           No follow-ups on file.

## 2023-05-05 LAB
FINAL PATHOLOGIC DIAGNOSIS: NORMAL
GROSS: NORMAL
Lab: NORMAL
MICROSCOPIC EXAM: NORMAL

## 2023-05-08 DIAGNOSIS — B35.1 ONYCHOMYCOSIS: Primary | ICD-10-CM

## 2023-05-08 RX ORDER — TERBINAFINE HYDROCHLORIDE 250 MG/1
250 TABLET ORAL DAILY
Qty: 42 TABLET | Refills: 0 | Status: SHIPPED | OUTPATIENT
Start: 2023-05-08 | End: 2023-07-31

## 2023-05-09 ENCOUNTER — TELEPHONE (OUTPATIENT)
Dept: DERMATOLOGY | Facility: CLINIC | Age: 78
End: 2023-05-09
Payer: MEDICARE

## 2023-05-09 NOTE — TELEPHONE ENCOUNTER
----- Message from Codie Hobbs MD sent at 5/8/2023  1:04 PM CDT -----  Final Pathologic Diagnosis Nail, right 4th digit, clippings:   -ONYCHOMYCOSIS   Comment: Interp By Lenny Cabrera M.D., Signed on 05/05/2023 at 11:29    ## please let her know the pathologist saw fungus on the clipping. I sent her a prescription for terbinafine once daily x 6 weeks   none

## 2023-06-27 DIAGNOSIS — I15.2 HYPERTENSION ASSOCIATED WITH DIABETES: ICD-10-CM

## 2023-06-27 DIAGNOSIS — E11.59 HYPERTENSION ASSOCIATED WITH DIABETES: ICD-10-CM

## 2023-06-27 RX ORDER — ATENOLOL 50 MG/1
50 TABLET ORAL DAILY
Qty: 90 TABLET | Refills: 3 | Status: SHIPPED | OUTPATIENT
Start: 2023-06-27

## 2023-06-27 NOTE — TELEPHONE ENCOUNTER
Care Due:                  Date            Visit Type   Department     Provider  --------------------------------------------------------------------------------                                EP -                              PRIMARY      [facility] FAMILY    Jb Sofia  Last Visit: 01-      CARE ([facility])   [facility]  Next Visit: None Scheduled  None         None Found                                                            Last  Test          Frequency    Reason                     Performed    Due Date  --------------------------------------------------------------------------------    HBA1C.......  6 months...  hali DIETZ.....  11- 05-    Health [facility] Embedded Care Due Messages. Reference number: [id number].   6/27/2023 8:29:14 AM CDT

## 2023-06-30 ENCOUNTER — PATIENT OUTREACH (OUTPATIENT)
Dept: ADMINISTRATIVE | Facility: HOSPITAL | Age: 78
End: 2023-06-30
Payer: MEDICARE

## 2023-12-06 DIAGNOSIS — R10.32 ABDOMINAL PAIN, LEFT LOWER QUADRANT: Primary | ICD-10-CM

## 2024-03-20 ENCOUNTER — TELEPHONE (OUTPATIENT)
Dept: FAMILY MEDICINE | Facility: CLINIC | Age: 79
End: 2024-03-20

## 2024-05-06 ENCOUNTER — TELEPHONE (OUTPATIENT)
Dept: FAMILY MEDICINE | Facility: CLINIC | Age: 79
End: 2024-05-06
Payer: MEDICARE

## 2024-05-06 NOTE — TELEPHONE ENCOUNTER
"Patient states she received a text stating there was an earlier appointment available with Dr. Winslow.  Patient states she does not use the portal as she does not know how to use it and does not know her password.  Writer offered number to [facility] technical support to assist with resetting password for future use.  Patient declined, patient states "You know I miss the good old days where a person called to schedule your appointment and if you wanted an earlier appointment and an earlier appointment became available a person would call you back and offer you the earlier appointment."  Writer advised patient would look to confirm if an earlier appointment was available.  Patient stated "You know if you get a little rude with them they will help you."  Writer apologized to patient and advised we strive to help everyone.  Offered appointment on Wednesday 5-8-24.  Patient accepted appointment.   "

## 2024-05-06 NOTE — TELEPHONE ENCOUNTER
Kate Beckham Staff     ----- Message from Kate Beckham sent at 5/6/2024  2:18 PM CDT -----  Type: Needs Medical Advice  Who Called:  pt  Symptoms (please be specific):    How long has patient had these symptoms:    Pharmacy name and phone #:    Best Call Back Number: [phone]    Additional Information: Pt is returning a text because she does not use the portal  Someone texted her about a sooner sppt.  Please call back to advise

## 2024-05-08 ENCOUNTER — OFFICE VISIT (OUTPATIENT)
Dept: FAMILY MEDICINE | Facility: CLINIC | Age: 79
End: 2024-05-08
Payer: MEDICARE

## 2024-05-08 VITALS
OXYGEN SATURATION: 96 % | HEART RATE: 88 BPM | BODY MASS INDEX: 26.12 KG/M2 | SYSTOLIC BLOOD PRESSURE: 120 MMHG | HEIGHT: 64 IN | DIASTOLIC BLOOD PRESSURE: 58 MMHG | WEIGHT: 153 LBS

## 2024-05-08 DIAGNOSIS — E11.69 TYPE 2 DIABETES MELLITUS WITH HYPERLIPIDEMIA: ICD-10-CM

## 2024-05-08 DIAGNOSIS — Z78.0 ASYMPTOMATIC MENOPAUSAL STATE: ICD-10-CM

## 2024-05-08 DIAGNOSIS — E78.5 TYPE 2 DIABETES MELLITUS WITH HYPERLIPIDEMIA: ICD-10-CM

## 2024-05-08 DIAGNOSIS — I10 BENIGN ESSENTIAL HTN: ICD-10-CM

## 2024-05-08 DIAGNOSIS — I70.0 AORTIC ATHEROSCLEROSIS: ICD-10-CM

## 2024-05-08 DIAGNOSIS — T46.6X5A MYALGIA DUE TO STATIN: ICD-10-CM

## 2024-05-08 DIAGNOSIS — M79.10 MYALGIA DUE TO STATIN: ICD-10-CM

## 2024-05-08 DIAGNOSIS — Z12.31 ENCOUNTER FOR SCREENING MAMMOGRAM FOR MALIGNANT NEOPLASM OF BREAST: Primary | ICD-10-CM

## 2024-05-08 PROBLEM — E21.3 HYPERPARATHYROIDISM: Status: RESOLVED | Noted: 2019-09-03 | Resolved: 2024-05-08

## 2024-05-08 PROCEDURE — 1159F MED LIST DOCD IN RCRD: CPT | Mod: CPTII,S$GLB,, | Performed by: STUDENT IN AN ORGANIZED HEALTH CARE EDUCATION/TRAINING PROGRAM

## 2024-05-08 PROCEDURE — 1101F PT FALLS ASSESS-DOCD LE1/YR: CPT | Mod: CPTII,S$GLB,, | Performed by: STUDENT IN AN ORGANIZED HEALTH CARE EDUCATION/TRAINING PROGRAM

## 2024-05-08 PROCEDURE — 99999 PR PBB SHADOW E&M-EST. PATIENT-LVL V: CPT | Mod: PBBFAC,,, | Performed by: STUDENT IN AN ORGANIZED HEALTH CARE EDUCATION/TRAINING PROGRAM

## 2024-05-08 PROCEDURE — 3078F DIAST BP <80 MM HG: CPT | Mod: CPTII,S$GLB,, | Performed by: STUDENT IN AN ORGANIZED HEALTH CARE EDUCATION/TRAINING PROGRAM

## 2024-05-08 PROCEDURE — 1126F AMNT PAIN NOTED NONE PRSNT: CPT | Mod: CPTII,S$GLB,, | Performed by: STUDENT IN AN ORGANIZED HEALTH CARE EDUCATION/TRAINING PROGRAM

## 2024-05-08 PROCEDURE — 3288F FALL RISK ASSESSMENT DOCD: CPT | Mod: CPTII,S$GLB,, | Performed by: STUDENT IN AN ORGANIZED HEALTH CARE EDUCATION/TRAINING PROGRAM

## 2024-05-08 PROCEDURE — G2211 COMPLEX E/M VISIT ADD ON: HCPCS | Mod: S$GLB,,, | Performed by: STUDENT IN AN ORGANIZED HEALTH CARE EDUCATION/TRAINING PROGRAM

## 2024-05-08 PROCEDURE — 1160F RVW MEDS BY RX/DR IN RCRD: CPT | Mod: CPTII,S$GLB,, | Performed by: STUDENT IN AN ORGANIZED HEALTH CARE EDUCATION/TRAINING PROGRAM

## 2024-05-08 PROCEDURE — 99214 OFFICE O/P EST MOD 30 MIN: CPT | Mod: S$GLB,,, | Performed by: STUDENT IN AN ORGANIZED HEALTH CARE EDUCATION/TRAINING PROGRAM

## 2024-05-08 PROCEDURE — 3074F SYST BP LT 130 MM HG: CPT | Mod: CPTII,S$GLB,, | Performed by: STUDENT IN AN ORGANIZED HEALTH CARE EDUCATION/TRAINING PROGRAM

## 2024-05-08 NOTE — PROGRESS NOTES
"[facility] MEDICINE CLINIC NOTE    Patient Name: Lulu Eden  YOB: 1945    PRESENTING HISTORY     History of Present Illness:  Ms. Lulu Eden is a 79 y.o. female here for routine checkup.     Sees Dr. 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   Feels like insect bites.   Left leg, right wrist, back of the neck.     Walks 15 minutes daily.     T2DM- runnning "high". She reports that Dr. Hernandez wants her running 159 but she is higher than this. Worse with dietary indiscretions. No lows.     Hx of statin myalgias, remote years ago.     Reviewed recommended screenings.       ROS      OBJECTIVE:   Vital Signs:  Vitals:    05/08/24 1100   BP: (!) 115/59   Pulse: 88   SpO2: 96%   Weight: 69.4 kg (153 lb)   Height: 5' 4" (1.626 m)          Physical Exam: Normal, no change.     Physical Exam    ASSESSMENT & PLAN:     Encounter for screening mammogram for malignant neoplasm of breast  -     Mammo Digital Screening Bilat w/ Sundeep; Future; Expected date: 05/08/2024    Asymptomatic menopausal state  -     DXA Bone Density Axial Skeleton 1 or more sites; Future; Expected date: 05/08/2024    Myalgia due to statin  See above    Type 2 diabetes mellitus with hyperlipidemia  Would recommend tighter control but will defer to her endocrinologist.     Aortic atherosclerosis  Continue aspirin, tighter BG control.      HTN  Controlled.       Jb Winslow  Internal Medicine    Visit complexity inherent to evaluation and management associated with medical care services that serve as the continuing focal point for all needed health care services and/or with medical care services that are part of ongoing care related to a patient's single, serious condition or a complex condition provided today          "

## 2024-05-08 NOTE — PATIENT INSTRUCTIONS
Prosper Lawson,     If you are due for any health screening(s) below please notify me so we can arrange them to be ordered and scheduled to maintain your health. Most healthy patients complete it. Don't lose out on improving your health.     Tests to Keep You Healthy                        A1c every 3-6 months, lipid panel every year, microalbumin (urine test) once per year, comprehensive foot exam once per year, dilated eye exam at least once per year, dental exam every 6 months, flu vaccination every year, and pneumonia vaccination(s) as recommended

## 2024-05-09 ENCOUNTER — HOSPITAL ENCOUNTER (OUTPATIENT)
Dept: RADIOLOGY | Facility: HOSPITAL | Age: 79
Discharge: HOME | End: 2024-05-09
Attending: STUDENT IN AN ORGANIZED HEALTH CARE EDUCATION/TRAINING PROGRAM
Payer: MEDICARE

## 2024-05-09 DIAGNOSIS — Z78.0 ASYMPTOMATIC MENOPAUSAL STATE: ICD-10-CM

## 2024-05-09 DIAGNOSIS — Z12.31 ENCOUNTER FOR SCREENING MAMMOGRAM FOR MALIGNANT NEOPLASM OF BREAST: ICD-10-CM

## 2024-05-09 PROCEDURE — 77067 SCR MAMMO BI INCL CAD: CPT | Mod: TC,PO

## 2024-05-09 PROCEDURE — 77080 DXA BONE DENSITY AXIAL: CPT | Mod: TC,PO

## 2024-05-09 PROCEDURE — 77063 BREAST TOMOSYNTHESIS BI: CPT | Mod: 26,,, | Performed by: RADIOLOGY

## 2024-05-09 PROCEDURE — 77080 DXA BONE DENSITY AXIAL: CPT | Mod: 26,,, | Performed by: RADIOLOGY

## 2024-05-09 PROCEDURE — 77067 SCR MAMMO BI INCL CAD: CPT | Mod: 26,,, | Performed by: RADIOLOGY

## 2024-05-10 DIAGNOSIS — M81.0 OSTEOPOROSIS, UNSPECIFIED OSTEOPOROSIS TYPE, UNSPECIFIED PATHOLOGICAL FRACTURE PRESENCE: ICD-10-CM

## 2024-05-10 DIAGNOSIS — E11.9 TYPE 2 DIABETES MELLITUS WITHOUT COMPLICATION, WITHOUT LONG-TERM CURRENT USE OF INSULIN: Primary | ICD-10-CM

## 2024-06-20 DIAGNOSIS — E11.59 HYPERTENSION ASSOCIATED WITH DIABETES: ICD-10-CM

## 2024-06-20 DIAGNOSIS — I15.2 HYPERTENSION ASSOCIATED WITH DIABETES: ICD-10-CM

## 2024-06-20 RX ORDER — ATENOLOL 50 MG/1
50 TABLET ORAL
Qty: 90 TABLET | Refills: 3 | Status: SHIPPED | OUTPATIENT
Start: 2024-06-20

## 2024-06-20 NOTE — TELEPHONE ENCOUNTER
Refill Decision Note   Lulu Eden  is requesting a refill authorization.  Brief Assessment and Rationale for Refill:  Approve     Medication Therapy Plan:        Comments:     Note composed:8:29 AM 06/20/2024

## 2024-06-20 NOTE — TELEPHONE ENCOUNTER
No care due was identified.  Health [facility] Embedded Care Due Messages. Reference number: [id number].   6/20/2024 12:20:33 AM CDT

## 2024-07-17 ENCOUNTER — TELEPHONE (OUTPATIENT)
Dept: FAMILY MEDICINE | Facility: CLINIC | Age: 79
End: 2024-07-17
Payer: MEDICARE

## 2024-07-17 NOTE — TELEPHONE ENCOUNTER
Mitzy Torresig Staff     ----- Message from Mitzy Torres sent at 7/17/2024 10:47 AM CDT -----  Regarding: Covid positive  Type:  Needs Medical Advice    Who Called: Pt       [id number] - [place], [place] - [address]  [address]  [place] [address]  Phone: [phone] Fax: [phone]        Would the patient rather a call back or a response via [facility]? Call back    Best Call Back Number: [phone]    Additional Information: Pt went to a urgent care and is Covid positive.  is requesting a call back for some meds to help with the pt ( pt coughing a lot). Pt got Levocetirizine 5ml/ Benzonatate 100ml cap/ Fluticasone  but is not helping . Thank you

## 2024-07-17 NOTE — TELEPHONE ENCOUNTER
Received the following message:  Additional Information: Pt went to a urgent care and is Covid positive.  is requesting a call back for some meds to help with the pt ( pt coughing a lot). Pt got Levocetirizine 5ml/ Benzonatate 100ml cap/ Fluticasone but is not helping . Thank you.        Follow up call placed to patient's  (Abhijit) who states patient was seen at [facility] on [facility] on Monday & diagnosed with Covid. Patient was given the above mentioned medications with no improvement in cough.  Denies any mucous production.  States the urgent care provider indicated there was an experimental medication that was available, and for patient to reach out to PCP to see if this or any other medication can be provided for cough.  Please advise. Thank you.

## 2024-07-18 NOTE — TELEPHONE ENCOUNTER
Patient's  (Abhijit) states patient does not wish to perform virtual appointment. Will send follow up message to Dr. Winslow for notification.          Jb Winslow MD Naccari Craig Staff50 minutes ago (9:00 AM)       Vv tomorrow

## 2025-06-15 DIAGNOSIS — I15.2 HYPERTENSION ASSOCIATED WITH DIABETES: ICD-10-CM

## 2025-06-15 DIAGNOSIS — E11.59 HYPERTENSION ASSOCIATED WITH DIABETES: ICD-10-CM

## 2025-06-15 NOTE — TELEPHONE ENCOUNTER
Care Due:                  Date            Visit Type   Department     Provider  --------------------------------------------------------------------------------                                EP -                              PRIMARY      [facility] FAMILY    Jb Sofia  Last Visit: 05-      CARE ([facility])   MEDICINE       Goldy  Next Visit: None Scheduled  None         None Found                                                            Last  Test          Frequency    Reason                     Performed    Due Date  --------------------------------------------------------------------------------    Office Visit  15 months..  atenoloL.................  05- 08-    Health [facility] Embedded Care Due Messages. Reference number: [id number].   6/15/2025 12:17:56 AM CDT

## 2025-06-15 NOTE — TELEPHONE ENCOUNTER
Refill Routing Note   Medication(s) are not appropriate for processing by [facility] for the following reason(s):        Required vitals outdated    [facility] action(s):  Defer   Requires appointment : Yes               Appointments  past 12m or future 3m with PCP    Date Provider   Last Visit   5/8/2024 Jb Winslow MD   Next Visit   Visit date not found Jb Winslow MD   ED visits in past 90 days: 0        Note composed:4:18 PM 06/15/2025

## 2025-06-16 RX ORDER — ATENOLOL 50 MG/1
50 TABLET ORAL
Qty: 90 TABLET | Refills: 3 | Status: SHIPPED | OUTPATIENT
Start: 2025-06-16

## 2025-06-16 NOTE — TELEPHONE ENCOUNTER
Left message for patient to return call in regards to scheduling an appointment for Mrs. Eden annual exam with either Dr. Winslow or ITFFANIE Em.

## 2025-06-20 ENCOUNTER — TELEPHONE (OUTPATIENT)
Dept: UROLOGY | Facility: CLINIC | Age: 80
End: 2025-06-20
Payer: MEDICARE

## 2025-06-20 NOTE — TELEPHONE ENCOUNTER
Spoke with patient, explained she is now a new patient to the office and we should be able to get her in sooner that offered. Patient offered appointment with NP and patient declined. Requested to be seen by Dr. Silva. Appointment scheduled for next available

## 2025-06-20 NOTE — TELEPHONE ENCOUNTER
Copied from CRM #[id number]. Topic: Appointments - Appointment Scheduling  >> Jun 20, 2025  3:26 PM Flora wrote:  Type:  Sooner Appointment Request    Caller is requesting a sooner appointment.  Caller declined first available appointment listed below.  Caller will not accept being placed on the waitlist and is requesting a message be sent to doctor.    Name of Caller:  munira  When is the first available appointment?  August 19th  Symptoms:  Bladder problem  Would the patient rather a call back or a response via [facility]? Call back  Best Call Back Number:  [phone]  Additional Information:

## 2025-06-23 ENCOUNTER — TELEPHONE (OUTPATIENT)
Dept: UROLOGY | Facility: CLINIC | Age: 80
End: 2025-06-23
Payer: MEDICARE

## 2025-07-18 NOTE — PROGRESS NOTES
[facility] Note    PCP: Jb Winslow MD    Chief Complaint: incontinence    SUBJECTIVE:       History of Present Illness:  Lulu Eden is a 80 y.o. female who presents to clinic for incontinence. She is New  to our clinic.     Current meds: none    Complaining of continuous leakage with no warning. Sudden gushes of urine.   No leakage with sneezing or activity.   She goes to the restroom frequently to prevent leakage.   Nocturia x 1. Hx of VENUS, does not wear a CPAP machine.   No dysuria or hematuria.     She has seen pelvic floor PT but did not feel this was effective.   Has previously taken Myrbetriq but does not remember this being effective.     She is on Jardiance, but symptoms did not worsen after this.     She has daily normal BMs.  Drinks a lot of water, iced tea, 3 cups of coffee.     Wears 3-4 ppd.     UA today: 1000 glucose  PVR today: 6 cc    Last urine culture: E coli (7/12/21)    Lab Results   Component Value Date    CREATININE 0.8 03/14/2023     Family  hx: no malignancy     Past medical, family, and social history reviewed as documented in chart with pertinent positive medical, family, and social history detailed in HPI.    Review of patient's allergies indicates:   Allergen Reactions    Codeine Other (See Comments)     Other reaction(s): Stomach upset  Other reaction(s): Stomach upset    Iodine Other (See Comments)     Other reaction(s): ear pain  Other reaction(s): ear pain    Pseudoephedrine Other (See Comments)    Pseudoephedrine hcl Other (See Comments)     Other reaction(s): Hallucinations       Past Medical History:   Diagnosis Date    ALLERGIC RHINITIS     Benign hypertension     Diabetes mellitus     Diabetes mellitus type II     History of colonic polyps     Hyperlipidemia     Obstructive sleep apnea     Skin cancer 2000?    right nasal ala, doesnt  know what type. .    Skin cancer of nose 2002    right nasal ala, .    Type 2 diabetes  "mellitus, uncontrolled 07/11/2012     Past Surgical History:   Procedure Laterality Date    COLONOSCOPY N/A 2/11/2016    Procedure: COLONOSCOPY;  Surgeon: Lloyd Bernabe MD;  Location: [facility];  Service: Endoscopy;  Laterality: N/A;    HYSTERECTOMY      KNEE ARTHROPLASTY  2010    bilateral    TONSILLECTOMY, ADENOIDECTOMY, BILATERAL MYRINGOTOMY AND TUBES  age 2    TOTAL KNEE ARTHROPLASTY      bilateral    TOTAL VAGINAL HYSTERECTOMY  1977     Family History   Problem Relation Name Age of Onset    Stroke Father 57     Heart attack Mother 51     Melanoma Sister      Cancer Sister          thyroid    Cancer Sister          leukemia    Skin cancer Sister       Social History[1]     Review of Systems    OBJECTIVE:     Anticoagulation:  aspirin 81 mg    Estimated body mass index is 26.26 kg/m² as calculated from the following:    Height as of this encounter: 5' 4" (1.626 m).    Weight as of this encounter: 69.4 kg (153 lb).    Vital Signs (Most Recent)       Physical Exam  Vitals reviewed.   Constitutional:       General: She is not in acute distress.     Appearance: Normal appearance. She is not ill-appearing.   HENT:      Head: Normocephalic and atraumatic.   Eyes:      General: No scleral icterus.  Pulmonary:      Effort: Pulmonary effort is normal. No respiratory distress.   Skin:     Coloration: Skin is not jaundiced.   Neurological:      General: No focal deficit present.      Mental Status: She is alert and oriented to person, place, and time.   Psychiatric:         Mood and Affect: Mood normal.         Behavior: Behavior normal.         BMP  Lab Results   Component Value Date     03/14/2023    K 4.5 03/14/2023     03/14/2023    CO2 23 03/14/2023    BUN 16 03/14/2023    CREATININE 0.8 03/14/2023    CALCIUM 10.0 03/14/2023    ANIONGAP 12 03/14/2023    ESTGFRAFRICA >60.0 01/21/2021    EGFRNONAA >60.0 01/21/2021       Lab Results   Component Value Date    WBC 7.77 10/21/2016    HGB 13.9 10/21/2016    " HCT 40.8 10/21/2016    MCV 95 10/21/2016     (H) 10/21/2016       Imaging:  No pertinent recent imaging available.    ASSESSMENT     1. Mixed stress and urge urinary incontinence    2. Overactive bladder    3. Pelvic floor dysfunction      PLAN:     - Sounds as though she has mixed incontinence.   - Trial of Gemtesa 75 mg. Has failed myrbetriq and is on BP medication.   - We also discussed the possibility of Bulkamid. Details of procedure were discussed.   - If medication is not effective or she is still bothered by her YEIMI symptoms we can go ahead with this.   - Follow up with NP in 8 weeks         Angela Silva MD       Visit today included increased complexity associated with the care of the episodic problem urinary incontinence addressed and managing the longitudinal care of the patient due to the serious and/or complex managed problem(s).           [1]   Social History  Tobacco Use    Smoking status: Never    Smokeless tobacco: Never   Substance Use Topics    Alcohol use: Yes     Comment: rare    Drug use: No

## 2025-07-21 ENCOUNTER — TELEPHONE (OUTPATIENT)
Dept: UROLOGY | Facility: CLINIC | Age: 80
End: 2025-07-21

## 2025-07-21 ENCOUNTER — OFFICE VISIT (OUTPATIENT)
Dept: UROLOGY | Facility: CLINIC | Age: 80
End: 2025-07-21
Payer: MEDICARE

## 2025-07-21 VITALS — HEIGHT: 64 IN | WEIGHT: 153 LBS | BODY MASS INDEX: 26.12 KG/M2

## 2025-07-21 DIAGNOSIS — N32.81 OVERACTIVE BLADDER: ICD-10-CM

## 2025-07-21 DIAGNOSIS — M62.89 PELVIC FLOOR DYSFUNCTION: ICD-10-CM

## 2025-07-21 DIAGNOSIS — N39.46 MIXED STRESS AND URGE URINARY INCONTINENCE: Primary | ICD-10-CM

## 2025-07-21 LAB
BILIRUBIN, UA POC OHS: NEGATIVE
BLOOD, UA POC OHS: NEGATIVE
CLARITY, UA POC OHS: CLEAR
COLOR, UA POC OHS: YELLOW
GLUCOSE, UA POC OHS: >=1000
KETONES, UA POC OHS: ABNORMAL
LEUKOCYTES, UA POC OHS: NEGATIVE
NITRITE, UA POC OHS: NEGATIVE
PH, UA POC OHS: 5.5
POC RESIDUAL URINE VOLUME: 6 ML (ref 0–100)
PROTEIN, UA POC OHS: NEGATIVE
SPECIFIC GRAVITY, UA POC OHS: 1.01
UROBILINOGEN, UA POC OHS: 0.2

## 2025-07-21 PROCEDURE — 99204 OFFICE O/P NEW MOD 45 MIN: CPT | Mod: S$GLB,,, | Performed by: STUDENT IN AN ORGANIZED HEALTH CARE EDUCATION/TRAINING PROGRAM

## 2025-07-21 PROCEDURE — 51798 US URINE CAPACITY MEASURE: CPT | Mod: S$GLB,,, | Performed by: STUDENT IN AN ORGANIZED HEALTH CARE EDUCATION/TRAINING PROGRAM

## 2025-07-21 PROCEDURE — 99999 PR PBB SHADOW E&M-EST. PATIENT-LVL III: CPT | Mod: PBBFAC,,, | Performed by: STUDENT IN AN ORGANIZED HEALTH CARE EDUCATION/TRAINING PROGRAM

## 2025-07-21 PROCEDURE — 3288F FALL RISK ASSESSMENT DOCD: CPT | Mod: CPTII,S$GLB,, | Performed by: STUDENT IN AN ORGANIZED HEALTH CARE EDUCATION/TRAINING PROGRAM

## 2025-07-21 PROCEDURE — 1101F PT FALLS ASSESS-DOCD LE1/YR: CPT | Mod: CPTII,S$GLB,, | Performed by: STUDENT IN AN ORGANIZED HEALTH CARE EDUCATION/TRAINING PROGRAM

## 2025-07-21 PROCEDURE — 81003 URINALYSIS AUTO W/O SCOPE: CPT | Mod: QW,S$GLB,, | Performed by: STUDENT IN AN ORGANIZED HEALTH CARE EDUCATION/TRAINING PROGRAM

## 2025-07-21 PROCEDURE — 1159F MED LIST DOCD IN RCRD: CPT | Mod: CPTII,S$GLB,, | Performed by: STUDENT IN AN ORGANIZED HEALTH CARE EDUCATION/TRAINING PROGRAM

## 2025-07-21 PROCEDURE — 1126F AMNT PAIN NOTED NONE PRSNT: CPT | Mod: CPTII,S$GLB,, | Performed by: STUDENT IN AN ORGANIZED HEALTH CARE EDUCATION/TRAINING PROGRAM

## 2025-07-21 PROCEDURE — G2211 COMPLEX E/M VISIT ADD ON: HCPCS | Mod: S$GLB,,, | Performed by: STUDENT IN AN ORGANIZED HEALTH CARE EDUCATION/TRAINING PROGRAM

## 2025-07-21 RX ORDER — VIBEGRON 75 MG/1
75 TABLET, FILM COATED ORAL DAILY
Qty: 30 TABLET | Refills: 11 | Status: SHIPPED | OUTPATIENT
Start: 2025-07-21

## 2025-08-20 ENCOUNTER — PATIENT MESSAGE (OUTPATIENT)
Dept: ADMINISTRATIVE | Facility: HOSPITAL | Age: 80
End: 2025-08-20
Payer: MEDICARE

## 2025-09-05 ENCOUNTER — TELEPHONE (OUTPATIENT)
Dept: UROLOGY | Facility: CLINIC | Age: 80
End: 2025-09-05

## 2025-09-05 ENCOUNTER — OFFICE VISIT (OUTPATIENT)
Dept: UROLOGY | Facility: CLINIC | Age: 80
End: 2025-09-05
Payer: MEDICARE

## 2025-09-05 VITALS — WEIGHT: 153 LBS | BODY MASS INDEX: 26.12 KG/M2 | HEIGHT: 64 IN

## 2025-09-05 DIAGNOSIS — N32.81 OVERACTIVE BLADDER: Primary | ICD-10-CM

## 2025-09-05 LAB — POC RESIDUAL URINE VOLUME: 29 ML (ref 0–100)

## 2025-09-05 PROCEDURE — 99999 PR PBB SHADOW E&M-EST. PATIENT-LVL III: CPT | Mod: PBBFAC,,, | Performed by: NURSE PRACTITIONER

## 2025-09-05 RX ORDER — VIBEGRON 75 MG/1
75 TABLET, FILM COATED ORAL DAILY
Qty: 30 TABLET | Refills: 11 | Status: SHIPPED | OUTPATIENT
Start: 2025-09-05